# Patient Record
Sex: FEMALE | Race: WHITE
[De-identification: names, ages, dates, MRNs, and addresses within clinical notes are randomized per-mention and may not be internally consistent; named-entity substitution may affect disease eponyms.]

---

## 2018-06-30 ENCOUNTER — HOSPITAL ENCOUNTER (EMERGENCY)
Dept: HOSPITAL 75 - ER | Age: 28
Discharge: HOME | End: 2018-06-30
Payer: COMMERCIAL

## 2018-06-30 VITALS — SYSTOLIC BLOOD PRESSURE: 133 MMHG | DIASTOLIC BLOOD PRESSURE: 78 MMHG

## 2018-06-30 VITALS — BODY MASS INDEX: 35.03 KG/M2 | HEIGHT: 66 IN | WEIGHT: 218 LBS

## 2018-06-30 DIAGNOSIS — N76.0: Primary | ICD-10-CM

## 2018-06-30 DIAGNOSIS — Z32.02: ICD-10-CM

## 2018-06-30 DIAGNOSIS — N72: ICD-10-CM

## 2018-06-30 LAB
ALBUMIN SERPL-MCNC: 4.4 GM/DL (ref 3.2–4.5)
ALP SERPL-CCNC: 87 U/L (ref 40–136)
ALT SERPL-CCNC: 13 U/L (ref 0–55)
BASOPHILS # BLD AUTO: 0 10^3/UL (ref 0–0.1)
BASOPHILS NFR BLD AUTO: 1 % (ref 0–10)
BILIRUB SERPL-MCNC: 0.4 MG/DL (ref 0.1–1)
BUN/CREAT SERPL: 14
CALCIUM SERPL-MCNC: 9.4 MG/DL (ref 8.5–10.1)
CHLORIDE SERPL-SCNC: 108 MMOL/L (ref 98–107)
CO2 SERPL-SCNC: 20 MMOL/L (ref 21–32)
CREAT SERPL-MCNC: 0.71 MG/DL (ref 0.6–1.3)
EOSINOPHIL # BLD AUTO: 0.1 10^3/UL (ref 0–0.3)
EOSINOPHIL NFR BLD AUTO: 1 % (ref 0–10)
ERYTHROCYTE [DISTWIDTH] IN BLOOD BY AUTOMATED COUNT: 12.6 % (ref 10–14.5)
GFR SERPLBLD BASED ON 1.73 SQ M-ARVRAT: > 60 ML/MIN
GLUCOSE SERPL-MCNC: 95 MG/DL (ref 70–105)
HCT VFR BLD CALC: 42 % (ref 35–52)
HGB BLD-MCNC: 14.9 G/DL (ref 11.5–16)
LYMPHOCYTES # BLD AUTO: 1.7 X 10^3 (ref 1–4)
LYMPHOCYTES NFR BLD AUTO: 21 % (ref 12–44)
MANUAL DIFFERENTIAL PERFORMED BLD QL: NO
MCH RBC QN AUTO: 31 PG (ref 25–34)
MCHC RBC AUTO-ENTMCNC: 36 G/DL (ref 32–36)
MCV RBC AUTO: 87 FL (ref 80–99)
MONOCYTES # BLD AUTO: 0.8 X 10^3 (ref 0–1)
MONOCYTES NFR BLD AUTO: 10 % (ref 0–12)
NEUTROPHILS # BLD AUTO: 5.5 X 10^3 (ref 1.8–7.8)
NEUTROPHILS NFR BLD AUTO: 67 % (ref 42–75)
PLATELET # BLD: 216 10^3/UL (ref 130–400)
PMV BLD AUTO: 11.3 FL (ref 7.4–10.4)
POTASSIUM SERPL-SCNC: 3.6 MMOL/L (ref 3.6–5)
PROT SERPL-MCNC: 7.3 GM/DL (ref 6.4–8.2)
RBC # BLD AUTO: 4.81 10^6/UL (ref 4.35–5.85)
SODIUM SERPL-SCNC: 139 MMOL/L (ref 135–145)
WBC # BLD AUTO: 8.1 10^3/UL (ref 4.3–11)

## 2018-06-30 PROCEDURE — 87070 CULTURE OTHR SPECIMN AEROBIC: CPT

## 2018-06-30 PROCEDURE — 96365 THER/PROPH/DIAG IV INF INIT: CPT

## 2018-06-30 PROCEDURE — 76830 TRANSVAGINAL US NON-OB: CPT

## 2018-06-30 PROCEDURE — 84703 CHORIONIC GONADOTROPIN ASSAY: CPT

## 2018-06-30 PROCEDURE — 36415 COLL VENOUS BLD VENIPUNCTURE: CPT

## 2018-06-30 PROCEDURE — 85025 COMPLETE CBC W/AUTO DIFF WBC: CPT

## 2018-06-30 PROCEDURE — 74177 CT ABD & PELVIS W/CONTRAST: CPT

## 2018-06-30 PROCEDURE — 87210 SMEAR WET MOUNT SALINE/INK: CPT

## 2018-06-30 PROCEDURE — 76856 US EXAM PELVIC COMPLETE: CPT

## 2018-06-30 PROCEDURE — 87491 CHLMYD TRACH DNA AMP PROBE: CPT

## 2018-06-30 PROCEDURE — 80053 COMPREHEN METABOLIC PANEL: CPT

## 2018-06-30 PROCEDURE — 87591 N.GONORRHOEAE DNA AMP PROB: CPT

## 2018-06-30 NOTE — XMS REPORT
[facility]

 Created on: 2018



Gayatri Taylor

External Reference #: [id number]

: 1990

Sex: Female



Demographics







 Address  [address] [place]  [address]

 

 Preferred Language  Unknown

 

 Marital Status  Unknown

 

 Congregation Affiliation  Unknown

 

 Race  Unknown

 

 Ethnic Group  Unknown





Author







 Author  ÁLVARODIMPLE

 

 Organization  [facility]

 

 Address  [address] [place]  [address]



 

 Phone  [phone]







Care Team Providers







 Care Team Member Name  Role  Phone

 

 DIMPLE REA  Unavailable  [phone]







PROBLEMS







 Type  Condition  ICD9-CM Code  ZZB01-MT Code  Onset Dates  Condition Status  
SNOMED Code

 

 Problem  Non-seasonal allergic rhinitis due to pollen     J30.1     Active  
[id number]

 

 Problem  Acquired hypothyroidism     E03.9     Active  973098178

 

 Problem  Mixed hyperlipidemia     E78.2     Active  [id number]

 

 Problem  Obesity (BMI 30-39.9)     E66.9     Active  367303977

 

 Problem  Mild single current episode of major depressive disorder     F32.0   
  Active  55122449







ALLERGIES

No Known Allergies



ENCOUNTERS







 Encounter  Location  Date  Diagnosis

 

 [place] [address] [phone]  05 Mar, 2018  Non-seasonal allergic rhinitis due to pollen J30.1

 

 [place] [address] [place] [id number] [place] [phone]
[phone]  26 Sep, 2017  Encounter to establish care with new doctor Z76.Brenden ; 
Acquired hypothyroidism E03.9 ; Mild single current episode of major depressive 
disorder F32.0 and Obesity (BMI 30-39.9) E66.9

 

 [place] [address] [place] [id number] [place] [address]-
[phone]  21 Sep, 2017  Encounter to establish care with new doctor Z76.89 ; 
Acquired hypothyroidism E03.9 ; Mild single current episode of major depressive 
disorder F32.0 ; Obesity (BMI 30-39.9) E66.9 and Encounter for immunization Z23

 

 [place] [address] [place] [id number] [place] [phone]-
[phone]  19 Sep, 2017   

 

 [place] [address] [place] [id number] [place] [phone]-
[phone]  21 Aug, 2017  Acute bronchitis, unspecified organism J20.9 ; Sore throat 
J02.9 and Elevated blood pressure reading R03.0

 

 [facility]  [address] [phone]    Irritable bowel syndrome with diarrhea K58.0 and Anhedonia 
R45.84

 

 [facility]  [address] [phone]-
[phone]    Major depressive disorder, single episode, moderate F32.1

 

 [facility]  [address] [phone]
[phone]  13 2016  Acute vomiting R11.10 and Acute diarrhea R19.7

 

 [facility]  [address] [phone]-
[phone]    Muscle spasm of back M62.830

 

 [facility]  [address] [phone]-
[phone]     

 

 [facility]  [address] [phone]-
[phone]     

 

 [facility]  [address] [phone]-
6260     

 

 [facility]  [address] [place] [id number] [place] [phone]-
[phone]     

 

 [facility]  [address] [place] [id number] [place] [phone]-
[phone]  02 Oct, 2014   

 

 [facility]  [address] [place] [id number] [place] [phone]
[phone]  02 Oct, 2014   

 

 [facility]  [address] [phone]-
[phone]  01 Oct, 2014   

 

 [facility]  [address] [phone]
[phone]  01 Oct, 2014   

 

 [facility]  [address] [place] [id number] [place] [phone]
[phone]  20 Mar, 2014   

 

 [facility]  [address] [facility] [id number] [place] [address]-
2943  20 Mar, 2014   

 

 [facility]  [address] [facility] [id number] [address]-
4012  11 Mar, 2014   

 

 [facility]  [address] [facility] [id number], [place] [address]-
4722  11 Mar, 2014   

 

 [facility]  [address] [facility] [id number], [place] [address]-
4380  15 Stas, 2013   

 

 [facility]  [address] [facility] [id number], [place] [phone]-
[phone]     







IMMUNIZATIONS

No Known Immunizations



SOCIAL HISTORY

Never Assessed



REASON FOR VISIT

Sore throat, cough, congestion, SOB since Friday, sent home from work today---
DBennettRN, nausea, will cough so hard she vomits, Max temp 101 on Friday



PLAN OF CARE







 Activity  Details









  









 Follow Up  4 Weeks Reason:needs to est. care







VITAL SIGNS







 Height  66 in  2017

 

 Weight  215 lbs  2017

 

 Temperature  98.8 degrees Fahrenheit  2017

 

 Heart Rate  120 bpm  2017

 

 Respiratory Rate  20   2017

 

 BMI  34.70 kg/m2  2017

 

 Blood pressure systolic  168 mmHg  2017

 

 Blood pressure diastolic  96 mmHg  2017







MEDICATIONS







 Medication  Instructions  Dosage  Frequency  Start Date  End Date  Duration  
Status

 

 ProAir  (90 Base) MCG/ACT  Inhalation every 4 hrs  2 puffs as needed  
4h  21 Aug, 2017     10 days  Active

 

 PredniSONE 20 mg  Orally Once a day  1 tablet  24h  21 Aug, 2017  26 Aug, 2017
  05 days  Active

 

 Azithromycin 250 MG  Orally Once a day  2 tablets  on the first day, then 1 
tablet daily for 4 days  24h  21 Aug, 2017  26 Aug, 2017  5 day(s)  Active

 

 Acidophilus                    Active







RESULTS

No Results



PROCEDURES







 Procedure  Date Ordered  Result  Body Site

 

 STREP A ASSAY W/OPTIC  Aug 21, 2017      







INSTRUCTIONS





MEDICATIONS ADMINISTERED

No Known Medications



MEDICAL (GENERAL) HISTORY







 Type  Description  Date

 

 Medical History  hypothyroidism; has not been on meds since age 15   

 

 Medical History  hypertension   

 

 Medical History  hyperglycemia   

 

 Surgical History  cracked tail bone  

 

 Surgical History  wisdom teeth extraction

## 2018-06-30 NOTE — ED ABDOMINAL PAIN
General


Chief Complaint:  Abdominal/GI Problems


Stated Complaint:  SHARP PAIN RIGHT SIDE ABDOMEN


Nursing Triage Note:  


ARRIVED VIA AMB TO ROOM 06 WITH COMPLAINTS OF RIGHT LOWER ABD PAIN STARTING 


YESTERDAY.  HAS ALSO HAD CRAMPS SINCE THE .


Sepsis Screen:  No Definite Risk


Source of Information:  Patient


Exam Limitations:  No Limitations





History of Present Illness


Date Seen by Provider:  2018


Time Seen by Provider:  14:44


Initial Comments


to ER with sharp right lower quadrant abdominal pain since the  of this 

month. She initially thought this with the onset of an early menstrual period. 

She should have started her menstrual cycle  but has not yet started it. 

She did follow with [facility] and had a negative pregnancy test done. She

's had some brownish colored vaginal discharge since the  of this month. 

She is sexually active.  She has nausea but no vomiting. No bowel changes. No 

fevers or chills.


Timing/Duration:  1-2 Days


Severity/Quality:  Moderate


Location:  RLQ


Radiation:  No Radiation


Activities at Onset:  None





Allergies and Home Medications


Allergies


Coded Allergies:  


     No Known Drug Allergies (Unverified , 11)





Home Medications


Metronidazole 500 Mg Tablet, 500 MG PO BID


   Prescribed by: ROSALIO SIGALA on 18 7928





Patient Home Medication List


Home Medication List Reviewed:  Yes





Review of Systems


Constitutional:  see HPI


EENTM:  No Symptoms Reported


Respiratory:  No Symptoms Reported


Cardiovascular:  No Symptoms Reported


Gastrointestinal:  See HPI, Abdominal Pain, Nausea; Denies Vomiting


Genitourinary:  No Symptoms Reported


Musculoskeletal:  no symptoms reported


Skin:  no symptoms reported


Psychiatric/Neurological:  No Symptoms Reported


Endocrine:  No Symptoms Reported





Past Medical-Social-Family Hx


Patient Social History


Alcohol Use:  Occasionally Uses


Recreational Drug Use:  No


Smoking Status:  Never a Smoker


Recent Foreign Travel:  No


Contact w/Someone Who Travel:  No


Recent Infectious Disease Expo:  No





Past Medical History


Surgeries:  Yes (TAILBONE SURGERY A LONG TIME AGO)


Respiratory:  No


Cardiac:  No


Neurological:  No


Reproductive Disorders:  No


Genitourinary:  No


Gastrointestinal:  No


Musculoskeletal:  No


Endocrine:  No


HEENT:  No


Cancer:  No


Psychosocial:  No


Integumentary:  No


Blood Disorders:  No





Physical Exam


Vital Signs





Vital Signs - First Documented








 18





 14:06


 


Temp 98.0





Capillary Refill : Less Than 3 Seconds


General Appearance:  WD/WN, no apparent distress


HEENT:  PERRL/EOMI, normal ENT inspection


Neck:  non-tender, full range of motion


Respiratory:  no respiratory distress, no accessory muscle use


Cardiovascular:  regular rate, rhythm, no murmur


Gastrointestinal:  normal bowel sounds, soft, tenderness


Pelvic:  normal external exam, discharge (small amount of whitish discharge. No 

brownish discharge noted.); No lesions, No mass; tender w/ cervical motion, 

other


Neurologic/Psychiatric:  alert, normal mood/affect, oriented x 3


Skin:  normal color, warm/dry





Progress/Results/Core Measures


Results/Orders


Lab Results





Laboratory Tests








Test


 18


14:30 Range/Units


 


 


White Blood Count


 8.1 


 4.3-11.0


10^3/uL


 


Red Blood Count


 4.81 


 4.35-5.85


10^6/uL


 


Hemoglobin 14.9  11.5-16.0  G/DL


 


Hematocrit 42  35-52  %


 


Mean Corpuscular Volume 87  80-99  FL


 


Mean Corpuscular Hemoglobin 31  25-34  PG


 


Mean Corpuscular Hemoglobin


Concent 36 


 32-36  G/DL





 


Red Cell Distribution Width 12.6  10.0-14.5  %


 


Platelet Count


 216 


 130-400


10^3/uL


 


Mean Platelet Volume 11.3 H 7.4-10.4  FL


 


Neutrophils (%) (Auto) 67  42-75  %


 


Lymphocytes (%) (Auto) 21  12-44  %


 


Monocytes (%) (Auto) 10  0-12  %


 


Eosinophils (%) (Auto) 1  0-10  %


 


Basophils (%) (Auto) 1  0-10  %


 


Neutrophils # (Auto) 5.5  1.8-7.8  X 10^3


 


Lymphocytes # (Auto) 1.7  1.0-4.0  X 10^3


 


Monocytes # (Auto) 0.8  0.0-1.0  X 10^3


 


Eosinophils # (Auto)


 0.1 


 0.0-0.3


10^3/uL


 


Basophils # (Auto)


 0.0 


 0.0-0.1


10^3/uL


 


Sodium Level 139  135-145  MMOL/L


 


Potassium Level 3.6  3.6-5.0  MMOL/L


 


Chloride Level 108 H   MMOL/L


 


Carbon Dioxide Level 20 L 21-32  MMOL/L


 


Anion Gap 11  5-14  MMOL/L


 


Blood Urea Nitrogen 10  7-18  MG/DL


 


Creatinine


 0.71 


 0.60-1.30


MG/DL


 


Estimat Glomerular Filtration


Rate > 60 


  





 


BUN/Creatinine Ratio 14   


 


Glucose Level 95    MG/DL


 


Calcium Level 9.4  8.5-10.1  MG/DL


 


Total Bilirubin 0.4  0.1-1.0  MG/DL


 


Aspartate Amino Transf


(AST/SGOT) 13 


 5-34  U/L





 


Alanine Aminotransferase


(ALT/SGPT) 13 


 0-55  U/L





 


Alkaline Phosphatase 87    U/L


 


Total Protein 7.3  6.4-8.2  GM/DL


 


Albumin 4.4  3.2-4.5  GM/DL


 


Serum Pregnancy Test,


Qualitative NEGATIVE 


 NEGATIVE  











Micro Results





Microbiology


18 Genital Culture, Resulted


          Pending


18 KOH Preparation - Final, Resulted


          


18 Wet Prep - Final, Resulted


          





My Orders





Orders - ROSALIO SIGALA


Cbc With Automated Diff (18 14:19)


Comprehensive Metabolic Panel (18 14:19)


Hcg,Qualitative Serum (18 14:19)


Iv Heplock-Insert (Order) (18 14:19)


Wet Prep (18 14:19)


Neisseria Gonorrhea Swab (18 14:19)


Genital Culture (18 14:19)


Koh Prep (18 14:19)


Chlamydia Trachomatis Swab (18 14:19)


Us Non Ob Pelvis Comp/Transvag (18 14:19)


Ct Abd/Pelv W (Appendicitis) (18 15:11)


Iohexol Injection (Omnipaque 350 Mg/Ml 1 (18 15:15)


Ns (Ivpb) (Sodium Chloride 0.9% Ivpb Bag (18 15:15)


Ceftriaxone Injection (Rocephin Injectio (18 16:00)


Azithromycin Tablet (Zithromax Tablet) (18 16:00)





Medications Given in ED





Current Medications








 Medications  Dose


 Ordered  Sig/Ambar


 Route  Start Time


 Stop Time Status Last Admin


Dose Admin


 


 Iohexol  100 ml  ONCE  ONCE


 IV  18 15:15


 18 15:16 UNV 18 15:24


100 ML


 


 Sodium Chloride  100 ml  ONCE  ONCE


 IV  18 15:15


 18 15:16 UNV 18 15:24


100 ML








Vital Signs/I&O











 18





 14:06


 


Temp 98.0


 


B/P (MAP) 











Diagnostic Imaging





   Diagonstic Imaging:  CT


Comments


NAME:   MARCELO SAVAGE


MED REC#:   [id number]


ACCOUNT#:   [id number]


PT STATUS:   REG ER


:   1990


PHYSICIAN:   ROSALIO SIGALA


ADMIT DATE:   18/ER


 ***Draft***


Date of Exam:18





CT ABD/PELV W (APPENDICITIS)








PROCEDURE: CT abdomen and pelvis with contrast, rule out


appendicitis.





TECHNIQUE: Multiple contiguous axial images were obtained through


the abdomen and pelvis after the administration of intravenous


contrast.





INDICATION: Right lower quadrant abdominal pain. 





COMPARISON: None.





FINDINGS: The lung bases are clear. The liver, gallbladder,


spleen, pancreas, adrenal glands, kidneys and vascular structures


are grossly unremarkable. The appendix is normal. Distal ureters


and urinary bladder are normal. The uterus is intact. There is no


free air or free fluid. There is a single prominent gas-filled


loop of small bowel, centrally, within the abdomen which could


represent an early ileus. There is no overt obstruction. There is


no significant constipation. There is no lymphadenopathy or


inflammatory process. Osseous structures are normal.





IMPRESSION: 


1. Prominent nonspecific gas-filled loop of small bowel in the


central abdomen, possibly an early ileus. There is no obstruction


or inflammatory process. 


2. Normal appendix.





  Dictated on workstation # [id number]








Dict:   18 1543


Trans:   18 1548


[facility] [id number]





Interpreted by:     CUBA ALVARES


Electronically signed by:





Departure


Impression





 Primary Impression:  


 Bacterial vaginosis


 Additional Impressions:  


 RLQ abdominal pain


 Cervicitis


Disposition:   HOME, SELF-CARE


Condition:  Stable





Departure-Patient Inst.


Decision time for Depature:  15:53


Referrals:  


[facility] (PCP/Family)


Primary Care Physician


Patient Instructions:  Bacterial Vaginosis





Add. Discharge Instructions:  


1. Return to ER for any concerns


2. Follow-up with your doctor next week


3. Medication as directed


All discharge instructions reviewed with patient and/or family. Voiced 

understanding.


Scripts


Metronidazole (Flagyl) 500 Mg Tablet


500 MG PO BID, #14 TAB


   Prov: ROSALIO SIGALA         18











ROSALIO SIGALA 2018 14:46

## 2018-06-30 NOTE — DIAGNOSTIC IMAGING REPORT
PROCEDURE: CT abdomen and pelvis with contrast, rule out

appendicitis.



TECHNIQUE: Multiple contiguous axial images were obtained through

the abdomen and pelvis after the administration of intravenous

contrast.



INDICATION: Right lower quadrant abdominal pain. 



COMPARISON: None.



FINDINGS: The lung bases are clear. The liver, gallbladder,

spleen, pancreas, adrenal glands, kidneys and vascular structures

are grossly unremarkable. The appendix is normal. Distal ureters

and urinary bladder are normal. The uterus is intact. There is no

free air or free fluid. There is a single prominent gas-filled

loop of small bowel, centrally, within the abdomen which could

represent an early ileus. There is no overt obstruction. There is

no significant constipation. There is no lymphadenopathy or

inflammatory process. Osseous structures are normal.



IMPRESSION: 

1. Prominent nonspecific gas-filled loop of small bowel in the

central abdomen, possibly an early ileus. There is no obstruction

or inflammatory process. 

2. Normal appendix.



Dictated by: 



  Dictated on workstation # [id number]

## 2018-06-30 NOTE — DIAGNOSTIC IMAGING REPORT
INDICATION: Lower pelvic pain. 



COMPARISON: None.



EXAMINATION: Pelvic ultrasound.



FINDINGS: The uterus measures 7 x 5 x 4 cm and has a normal

appearance. The endometrium is normal at 6 mm. The right ovary

measures 2.5 x 4.0 x 2.5 cm. The left ovary is not identified.

There is no mass or free fluid. 



IMPRESSION: Left ovary is not identified by ultrasound;

otherwise, negative pelvic sonogram. 



Dictated by: 



  Dictated on workstation # [id number]

## 2020-01-07 ENCOUNTER — HOSPITAL ENCOUNTER (EMERGENCY)
Dept: HOSPITAL 75 - ER | Age: 30
Discharge: HOME | End: 2020-01-07
Payer: COMMERCIAL

## 2020-01-07 VITALS — SYSTOLIC BLOOD PRESSURE: 133 MMHG | DIASTOLIC BLOOD PRESSURE: 72 MMHG

## 2020-01-07 VITALS — BODY MASS INDEX: 38.62 KG/M2 | WEIGHT: 237.44 LBS | HEIGHT: 65.75 IN

## 2020-01-07 DIAGNOSIS — Z79.52: ICD-10-CM

## 2020-01-07 DIAGNOSIS — J06.9: Primary | ICD-10-CM

## 2020-01-07 PROCEDURE — 87804 INFLUENZA ASSAY W/OPTIC: CPT

## 2020-01-07 NOTE — ED COUGH/URI
General


Chief Complaint:  Cough/Cold/Flu Symptoms


Stated Complaint:  COUGH,CONGESTION


Nursing Triage Note:  


ARRIVED VIA AMB TO TRIAGE WITH COUGH AND CONGESTION X1 WEEK.  STATES SHE WENT TO




[facility] AND THEY DID NOT GIVE HER ANY MEDS A COUPLE OF DAYS AGO AND SHE IS NOT 


BETTER.


Sepsis Screen:  No Definite Risk





History of Present Illness


Date Seen by Provider:  Jan 7, 2020


Time Seen by Provider:  17:45


Initial Comments


29-year-old  female reports cough for the last week. She has been 

taking Mucinex with no improvement in her symptoms. She denies getting a flu 

vaccine pressure. No fevers.


Timing/Duration:  intermittent


Severity/Quality:  dry cough


Prior Episodes/Possible Cause:  no prior episodes


Associated Symptoms:  cough, nasal congestion





Allergies and Home Medications


Allergies


Coded Allergies:  


     No Known Drug Allergies (Unverified , 1/23/11)





Home Medications


Albuterol Sulfate 6.7 Gm Hfa.aer.ad, 2 PUFF INH Q6H


   Prescribed by: NESSA SHIN on 1/7/20 1836


Prednisone 20 Mg Tab, 40 MG PO DAILY


   Prescribed by: NESSA SHIN on 1/7/20 1836





Patient Home Medication List


Home Medication List Reviewed:  Yes





Review of Systems


Review of Systems


Constitutional:  see HPI, malaise


Respiratory:  see HPI, cough; No short of breath





All Other Systems Reviewed


Negative Unless Noted:  Yes





Past Medical-Social-Family Hx


Past Med/Social Hx:  Reviewed Nursing Past Med/Soc Hx


Patient Social History


Recent Foreign Travel:  No


Contact w/Someone Who Travel:  No


Recent Infectious Disease Expo:  No





Past Medical History


Surgeries:  Yes (TAILBONE SURGERY A LONG TIME AGO)


Respiratory:  No


Cardiac:  No


Neurological:  No


Pregnant:  No


Last Menstrual Period:  Jan 7, 2020


Reproductive Disorders:  No


Genitourinary:  No


Gastrointestinal:  No


Musculoskeletal:  No


Endocrine:  No


HEENT:  No


Cancer:  No


Psychosocial:  No


Integumentary:  No


Blood Disorders:  No





Physical Exam





Vital Signs - First Documented








 1/7/20





 17:26


 


Temp 36.9


 


Pulse 118


 


Resp 18


 


B/P (MAP) 133/72 (92)


 


Pulse Ox 98


 


O2 Delivery Room Air





Capillary Refill : Less Than 3 Seconds


Height: 5'6.00"


Weight: 218lbs. oz. 98.126665oq; 38.00 BMI


Method:Stated


General Appearance:  WD/WN, no apparent distress


HEENT:  PERRL/EOMI, normal ENT inspection, TMs normal, pharynx normal


Neck:  non-tender, full range of motion, supple, normal inspection


Respiratory:  chest non-tender, lungs clear, normal breath sounds


Cardiovascular:  normal peripheral pulses, regular rate, rhythm


Gastrointestinal:  normal bowel sounds, non tender, soft


Neurologic/Psychiatric:  no motor/sensory deficits, alert, normal mood/affect, 

oriented x 3


Skin:  normal color, warm/dry





Progress/Results/Core Measures


Suspected Sepsis


Recent Fever Within 48 Hours:  No


Infection Criteria Present:  Suspected New Infection


New/Unexplained  Altered Menta:  No


Sepsis Screen:  No Definite Risk


SIRS


Temperature: 


Pulse: 118 


Respiratory Rate: 18


 


Blood Pressure 133 /72 


Mean: 92





Results/Orders


Micro Results





Microbiology


1/7/20 Influenza Types A,B Antigen (JORDI) - Final, Complete


         





My Orders





Orders - NESSA SHIN


Influenza A And B Antigens (1/7/20 17:45)





Vital Signs/I&O











 1/7/20 1/7/20





 17:26 18:53


 


Temp 36.9 36.9


 


Pulse 118 118


 


Resp 18 18


 


B/P (MAP) 133/72 (92) 133/72 (92)


 


Pulse Ox 98 98


 


O2 Delivery Room Air 





Capillary Refill : Less Than 3 Seconds








Blood Pressure Mean:                    92








Progress Note :  


   Time:  17:45


Progress Note


Patient seen and evaluated, will obtain influenza swab and reevaluate. Pulse is 

80 at this time.





Departure


Impression





   Primary Impression:  


   Upper respiratory infection


   Qualified Codes:  J06.9 - Acute upper respiratory infection, unspecified


Disposition:  01 HOME, SELF-CARE


Condition:  Improved





Departure-Patient Inst.


Decision time for Depature:  18:45


Referrals:  


[facility] [place]


Primary Care Physician


Patient Instructions:  Cough, Adult (DC), Viral Upper Respiratory Infection, 

Adult (DC)





Add. Discharge Instructions:  


Continue taking Mucinex one tablet every 12 hours with a full glass of water.


Use the inhaler every 4-6 hours, 2 puffs waiting 5-10 minutes between puffs.


Sinus irrigation with a Olive pot or Kash med bottle, every 4 hours, while 

awake. 


Follow-up with your primary care provider if symptoms are not improving or 

worsen.


Alternate between Tylenol 650 mg and Tylenol 600 mg every 4 hours for fever or 

discomfort.


Increase rest.


Return to the emergency department for new, urgent health care needs.





All discharge instructions reviewed with patient and/or family. Voiced 

understanding.


Scripts


Albuterol Sulfate (Proventil Hfa) 6.7 Gm Hfa.aer.ad


2 PUFF INH Q6H for SHORTNESS OF BREATH for 10 Days, #1 EACH 0 Refills


   Prov: NESSA SHIN         1/7/20 


Prednisone (Prednisone) 20 Mg Tab


40 MG PO DAILY, #8 TAB 0 Refills


   Prov: NESSA SHIN         1/7/20


Work/School Note:  Work Release Form   Date Seen in the Emergency Department:  

Jan 7, 2020


   Return to Work:  Jan 8, 2020


   Restrictions:  No Restrictions











NESSA SHIN                   Jan 7, 2020 18:23

## 2021-02-18 ENCOUNTER — HOSPITAL ENCOUNTER (EMERGENCY)
Dept: HOSPITAL 75 - ER | Age: 31
Discharge: HOME | End: 2021-02-18
Payer: SELF-PAY

## 2021-02-18 VITALS — DIASTOLIC BLOOD PRESSURE: 79 MMHG | SYSTOLIC BLOOD PRESSURE: 140 MMHG

## 2021-02-18 VITALS — WEIGHT: 239.86 LBS | HEIGHT: 66.97 IN | BODY MASS INDEX: 37.65 KG/M2

## 2021-02-18 DIAGNOSIS — N39.0: ICD-10-CM

## 2021-02-18 DIAGNOSIS — K80.20: Primary | ICD-10-CM

## 2021-02-18 DIAGNOSIS — E66.9: ICD-10-CM

## 2021-02-18 DIAGNOSIS — Z77.22: ICD-10-CM

## 2021-02-18 LAB
ALBUMIN SERPL-MCNC: 5 GM/DL (ref 3.2–4.5)
ALP SERPL-CCNC: 102 U/L (ref 40–136)
ALT SERPL-CCNC: 36 U/L (ref 0–55)
AMYLASE SERPL-CCNC: 65 U/L (ref 25–125)
APTT PPP: YELLOW S
BACTERIA #/AREA URNS HPF: (no result) /HPF
BASOPHILS # BLD AUTO: 0.1 10^3/UL (ref 0–0.1)
BASOPHILS NFR BLD AUTO: 1 % (ref 0–10)
BILIRUB SERPL-MCNC: 0.2 MG/DL (ref 0.1–1)
BILIRUB UR QL STRIP: NEGATIVE
BUN/CREAT SERPL: 15
CALCIUM SERPL-MCNC: 10 MG/DL (ref 8.5–10.1)
CHLORIDE SERPL-SCNC: 105 MMOL/L (ref 98–107)
CO2 SERPL-SCNC: 19 MMOL/L (ref 21–32)
CREAT SERPL-MCNC: 0.81 MG/DL (ref 0.6–1.3)
EOSINOPHIL # BLD AUTO: 0 10^3/UL (ref 0–0.3)
EOSINOPHIL NFR BLD AUTO: 0 % (ref 0–10)
FIBRINOGEN PPP-MCNC: CLEAR MG/DL
GFR SERPLBLD BASED ON 1.73 SQ M-ARVRAT: > 60 ML/MIN
GLUCOSE SERPL-MCNC: 83 MG/DL (ref 70–105)
GLUCOSE UR STRIP-MCNC: NEGATIVE MG/DL
HCT VFR BLD CALC: 48 % (ref 35–52)
HGB BLD-MCNC: 15.8 G/DL (ref 11.5–16)
KETONES UR QL STRIP: NEGATIVE
LEUKOCYTE ESTERASE UR QL STRIP: NEGATIVE
LIPASE SERPL-CCNC: 29 U/L (ref 8–78)
LYMPHOCYTES # BLD AUTO: 2 10^3/UL (ref 1–4)
LYMPHOCYTES NFR BLD AUTO: 20 % (ref 12–44)
MANUAL DIFFERENTIAL PERFORMED BLD QL: NO
MCH RBC QN AUTO: 30 PG (ref 25–34)
MCHC RBC AUTO-ENTMCNC: 33 G/DL (ref 32–36)
MCV RBC AUTO: 91 FL (ref 80–99)
MONOCYTES # BLD AUTO: 0.7 10^3/UL (ref 0–1)
MONOCYTES NFR BLD AUTO: 7 % (ref 0–12)
NEUTROPHILS # BLD AUTO: 7.3 10^3/UL (ref 1.8–7.8)
NEUTROPHILS NFR BLD AUTO: 72 % (ref 42–75)
NITRITE UR QL STRIP: NEGATIVE
PH UR STRIP: 6.5 [PH] (ref 5–9)
PLATELET # BLD: 234 10^3/UL (ref 130–400)
PMV BLD AUTO: 11.4 FL (ref 9–12.2)
POTASSIUM SERPL-SCNC: 3.9 MMOL/L (ref 3.6–5)
PROT SERPL-MCNC: 8.7 GM/DL (ref 6.4–8.2)
PROT UR QL STRIP: NEGATIVE
RBC #/AREA URNS HPF: (no result) /HPF
SODIUM SERPL-SCNC: 140 MMOL/L (ref 135–145)
SP GR UR STRIP: 1.02 (ref 1.02–1.02)
SQUAMOUS #/AREA URNS HPF: (no result) /HPF
WBC # BLD AUTO: 10.1 10^3/UL (ref 4.3–11)
WBC #/AREA URNS HPF: (no result) /HPF

## 2021-02-18 PROCEDURE — 81000 URINALYSIS NONAUTO W/SCOPE: CPT

## 2021-02-18 PROCEDURE — 74176 CT ABD & PELVIS W/O CONTRAST: CPT

## 2021-02-18 PROCEDURE — 83690 ASSAY OF LIPASE: CPT

## 2021-02-18 PROCEDURE — 85025 COMPLETE CBC W/AUTO DIFF WBC: CPT

## 2021-02-18 PROCEDURE — 74022 RADEX COMPL AQT ABD SERIES: CPT

## 2021-02-18 PROCEDURE — 82150 ASSAY OF AMYLASE: CPT

## 2021-02-18 PROCEDURE — 80053 COMPREHEN METABOLIC PANEL: CPT

## 2021-02-18 PROCEDURE — 87088 URINE BACTERIA CULTURE: CPT

## 2021-02-18 PROCEDURE — 36415 COLL VENOUS BLD VENIPUNCTURE: CPT

## 2021-02-18 PROCEDURE — 84703 CHORIONIC GONADOTROPIN ASSAY: CPT

## 2021-02-18 PROCEDURE — 93041 RHYTHM ECG TRACING: CPT

## 2021-02-18 NOTE — DIAGNOSTIC IMAGING REPORT
CLINICAL INDICATION: Patient with abdominal pain and flank pain.



EXAMS: X-ray of the chest PA view and x-ray of the abdomen supine

and upright views.



COMPARISONS: CT scan of the abdomen and pelvis without contrast

dated 02/18/2021. All CT scans use one or more of the following

dose optimizing techniques: automated exposure control, MA and/or

KvP adjustment based on patient size and exam type or iterative

reconstruction.  



FINDINGS:



LUNGS/ PLEURA: Lungs are clear. There is no pneumothorax. There

is no pleural effusion.



MEDIASTINUM: Unremarkable. 



PULMONARY VASCULATURE: Unremarkable.



HEART: Unremarkable.



BONES/ EXTRATHORACIC SOFT TISSUE:  Unremarkable.



ABDOMEN AND PELVIS:

Unremarkable x-ray of the abdomen with nonobstructed bowel gas

pattern. There is no evidence of abdominal free air. There are

multiple shadows of stones overlying the right upper quadrant

which correlates to cholelithiasis. This is better seen on

comparison CT scan.



There are no focal calcifications overlying the expected regions/

pathways of both kidneys, ureters, and bladder regions.



IMPRESSION:

1: There is no radiographic evidence of acute cardiopulmonary

process.



2: Cholelithiasis which is better seen on comparison CT scan.

Otherwise, unremarkable x-ray of the abdomen. 



Dictated by: 



  Dictated on workstation # [id number]

## 2021-02-18 NOTE — DIAGNOSTIC IMAGING REPORT
Clinical indication: Patient with right flank pain and abdominal

pain.



Exam: CT exam of the abdomen and pelvis is performed without IV

or oral contrast using stone protocol. Coronal and sagittal

reformatted images were created. Auto Exposure Controls were

utilized during the CT exam to meet ALARA standards for radiation

dose reduction.



Comparison: CT scan of the abdomen and pelvis performed with

contrast dated 06/30/2018.



Findings: Visualized lung bases are clear. There are small spurs

involving the lumbar spine. There is interval development of

multiple stones within the predominantly decompressed

gallbladder. There is no CT evidence of acute cholecystitis. The

liver, spleen, pancreas and adrenal glands are unremarkable. Both

kidneys are unremarkable with no stone or mass. There is no

hydronephrosis. Bladder is partially fluid-filled and otherwise

unremarkable. Uterus and adnexal structures are unremarkable.

There is no significant intra-abdominal or intra-pelvic

lymphadenopathy. There is no acute abdominal free air or free

fluid. Stable nodular area seen anterior to the spleen which may

represent a splenule. There is small amount of stool in the right

colon and rectum region. There is no intestinal obstruction.

Stable subcentimeter in short axis mesenteric lymph node seen.

Appendix is unremarkable. The extra-abdominal and extra-pelvis

soft tissue structures are unremarkable.



Impression: 

1: There is no CT evidence of acute abdominal pelvic process.

There are no renal stones seen or hydronephrosis.



2: Interval development of cholelithiasis with multiple stones

within the decompressed gallbladder. There is no CT evidence of

acute cholecystitis.



3: The remainder of this exam shows no significant abnormality. 



Dictated by: 



  Dictated on workstation # [id number]

## 2021-02-18 NOTE — ED ABDOMINAL PAIN
General


Chief Complaint:  Abdominal/GI Problems


Stated Complaint:  LOWER BACK / ABDOMINAL PAIN


Nursing Triage Note:  


PATIENT STATES THAT SHE HAS HAD LEFT SIDE PAIN FOR THE PAST TWO MONTHS AND 


WORSENING TODAY. CURRENTLY RATING 7/10.  STATES THAT HER URINE IS DARK. SHE WENT




TO [facility] TODAY FOR THIS COMPLAINT.  SHE STATES THAT SHE IS FRUSTRATED THAT THEY 


WERE ABLE TO GIVE HER A DEFINITE DIAGNOSIS.  SHE STATES THAT SHE WAS TOLD THAT 


IT COULD BE POLYCYSTIC OVARIAN SYNDROME.


Sepsis Screen:  No Definite Risk


Source of Information:  Patient





History of Present Illness


Date Seen by Provider:  Feb 18, 2021


Time Seen by Provider:  20:39


Initial Comments


PT ARRIVES VIA POV FROM HOME


C/O LEFT FLANK PAIN FOR 2 MONTHS, WORSE TODAY. 


SEEN AT [facility] TODAY, NO TESTS DONE AND NO DX, NO RX


NO RADIATION OF PAIN 


STATES HER URINE IS DARK AND OCCASIONALLY IT "STINGS" WHEN SHE URINATES


HAD FEVER  A COUPLE OF WEEKS AGO\


AT THAT TIME SHE DID HAVE NAUSEA AND VOMITED X 1 WHEN SHE HAD FEVER, NONE SINCE


HAS HAD DIARRHEA OFF AND ON--LAST TIME WAS 2 DAYS AGO





HAS NOT TAKEN ANYTHING FOR PAIN 





LMP--2 MONTHS AGO. STATES SHE HAS HAD OVARIAN CYSTS AND POSSIBLE PCOS. TAKES 

SPIRONOLACTONE FOR ELEVATED TESTOSTERONE LEVELS. 








NO COUGH


NO CHEST PAIN 


NO SHORTNESS OF BREATH


NO SORE THROAT OR LOSS OF TASTE OR SMELL


HAS BEEN EXPOSED TO COVID AT WORK ( WORKS AT [employer]) --HAD A 

NEGATIVE TEST 1 MONTH AGO.





[facility]





Allergies and Home Medications


Allergies


Coded Allergies:  


     No Known Drug Allergies (Unverified , 1/23/11)





Home Medications


Albuterol Sulfate 6.7 Gm Hfa.aer.ad, 2 PUFF INH Q6H


   Prescribed by: NESSA SHIN on 1/7/20 1836


Cyclobenzaprine HCl 10 Mg Tablet, 10 MG PO Q8H PRN for SPASMS


   Prescribed by: BETITO CHANEL on 2/18/21 2227


Fluticasone Propionate 9.9 Ml [place].susp, 1 SPRAY NS DAILY, (Reported)


   1 SPRAY EACH NARE DAILY 


Meloxicam 15 Mg Tablet, 15 MG PO DAILY


   Prescribed by: BETITO CHANEL on 2/18/21 2227


Nitrofurantoin Monohyd/M-Cryst 100 Mg Capsule, 1 TAB PO BID


   Prescribed by: BETITO CHANEL on 2/18/21 2227


Ondansetron 4 Mg Tab.rapdis, 4 MG PO Q4H


   Prescribed by: BETITO CHANEL on 2/18/21 2227





Patient Home Medication List


Home Medication List Reviewed:  Yes





Review of Systems


Review of Systems


Constitutional:  see HPI (NO FEVER FOR 2 WEEKS)


EENTM:  No Symptoms Reported


Respiratory:  No Symptoms Reported


Cardiovascular:  No Symptoms Reported


Gastrointestinal:  See HPI


Genitourinary:  See HPI, Burning, Flank Pain


Musculoskeletal:  see HPI, back pain


Skin:  no symptoms reported


Psychiatric/Neurological:  No Symptoms Reported


Endocrine:  See HPI


Hematologic/Lymphatic:  No Symptoms Reported





Past Medical-Social-Family Hx


Past Med/Social Hx:  Reviewed and Corrections made


Patient Social History


Alcohol Use:  Occasionally Uses


Drug of Choice:  DENIES


Smoking Status:  Never a Smoker


2nd Hand Smoke Exposure:  Yes


Recent Infectious Disease Expo:  No





Past Medical History


Surgeries:  Yes (TAILBONE SURGERY A LONG TIME AGO/?PILONIDAL CYST: OVARIAN CYST 

REMOVAL)


Respiratory:  No


Cardiac:  No


Neurological:  No


Reproductive Disorders:  Yes (HIGH TESTOSTERONE)


Female Reproductive Disorders:  Menstrual Problems, Ovarian Cyst


Genitourinary:  No


Gastrointestinal:  Yes (GALLSTONES NOTED ON CT SCAN 02/18/21)


Gall Bladder Disease


Musculoskeletal:  No


Endocrine:  No


HEENT:  No


Cancer:  No


Psychosocial:  No


Integumentary:  No


Blood Disorders:  No





Physical Exam


Vital Signs





Vital Signs - First Documented








 2/18/21





 20:30


 


Temp 36.5


 


Pulse 104


 


Resp 22


 


B/P (MAP) 168/98 (121)


 


Pulse Ox 98


 


O2 Delivery Room Air





Capillary Refill : Less Than 3 Seconds


Height/Weight/BMI


Height: 5'6.00"


Weight: 218lbs. oz. 98.881635re; 37.00 BMI


Method:Stated


General Appearance:  WD/WN, no apparent distress, obese


Neck:  normal inspection


Respiratory:  normal breath sounds, no respiratory distress, no accessory muscle

use


Cardiovascular:  regular rate, rhythm, no murmur


Gastrointestinal:  normal bowel sounds, soft, no organomegaly, no pulsatile 

mass; No distended, No guarding, No rebound; tenderness (MILD TENDERNESS TO LEFT

FLANK AND TO SUPRAPUBIC AREA. ); No hernia, No mass


Extremities:  normal inspection, no pedal edema, normal capillary refill


Back:  no vertebral tenderness, CVA tenderness (L)


Neurologic/Psychiatric:  CNs II-XII nml as tested, no motor/sensory deficits, 

alert, normal mood/affect, oriented x 3


Skin:  normal color, warm/dry; No rash





Progress/Results/Core Measures


Results/Orders


Lab Results





Laboratory Tests








Test


 2/18/21


21:00 2/18/21


21:54 Range/Units


 


 


Urine Color YELLOW    


 


Urine Clarity CLEAR    


 


Urine pH 6.5   5-9  


 


Urine Specific Gravity 1.020   1.016-1.022  


 


Urine Protein NEGATIVE   NEGATIVE  


 


Urine Glucose (UA) NEGATIVE   NEGATIVE  


 


Urine Ketones NEGATIVE   NEGATIVE  


 


Urine Nitrite NEGATIVE   NEGATIVE  


 


Urine Bilirubin NEGATIVE   NEGATIVE  


 


Urine Urobilinogen 1.0   < = 1.0  MG/DL


 


Urine Leukocyte Esterase NEGATIVE   NEGATIVE  


 


Urine RBC (Auto) NEGATIVE   NEGATIVE  


 


Urine RBC NONE    /HPF


 


Urine WBC RARE    /HPF


 


Urine Squamous Epithelial


Cells 5-10 


 


  /HPF





 


Urine Crystals NONE    /LPF


 


Urine Bacteria MODERATE H   /HPF


 


Urine Casts NONE    /LPF


 


Urine Mucus SMALL H   /LPF


 


Urine Culture Indicated YES    


 


White Blood Count


 


 10.1 


 4.3-11.0


10^3/uL


 


Red Blood Count


 


 5.25 H


 3.80-5.11


10^6/uL


 


Hemoglobin  15.8  11.5-16.0  g/dL


 


Hematocrit  48  35-52  %


 


Mean Corpuscular Volume  91  80-99  fL


 


Mean Corpuscular Hemoglobin  30  25-34  pg


 


Mean Corpuscular Hemoglobin


Concent 


 33 


 32-36  g/dL





 


Red Cell Distribution Width  12.1  10.0-14.5  %


 


Platelet Count


 


 234 


 130-400


10^3/uL


 


Mean Platelet Volume  11.4  9.0-12.2  fL


 


Immature Granulocyte % (Auto)  0   %


 


Neutrophils (%) (Auto)  72  42-75  %


 


Lymphocytes (%) (Auto)  20  12-44  %


 


Monocytes (%) (Auto)  7  0-12  %


 


Eosinophils (%) (Auto)  0  0-10  %


 


Basophils (%) (Auto)  1  0-10  %


 


Neutrophils # (Auto)


 


 7.3 


 1.8-7.8


10^3/uL


 


Lymphocytes # (Auto)


 


 2.0 


 1.0-4.0


10^3/uL


 


Monocytes # (Auto)


 


 0.7 


 0.0-1.0


10^3/uL


 


Eosinophils # (Auto)


 


 0.0 


 0.0-0.3


10^3/uL


 


Basophils # (Auto)


 


 0.1 


 0.0-0.1


10^3/uL


 


Immature Granulocyte # (Auto)


 


 0.0 


 0.0-0.1


10^3/uL


 


Sodium Level  140  135-145  MMOL/L


 


Potassium Level  3.9  3.6-5.0  MMOL/L


 


Chloride Level  105    MMOL/L


 


Carbon Dioxide Level  19 L 21-32  MMOL/L


 


Anion Gap  16 H 5-14  MMOL/L


 


Blood Urea Nitrogen  12  7-18  MG/DL


 


Creatinine


 


 0.81 


 0.60-1.30


MG/DL


 


Estimat Glomerular Filtration


Rate 


 > 60 


  





 


BUN/Creatinine Ratio  15   


 


Glucose Level  83    MG/DL


 


Calcium Level  10.0  8.5-10.1  MG/DL


 


Corrected Calcium    8.5-10.1  MG/DL


 


Total Bilirubin  0.2  0.1-1.0  MG/DL


 


Aspartate Amino Transf


(AST/SGOT) 


 21 


 5-34  U/L





 


Alanine Aminotransferase


(ALT/SGPT) 


 36 


 0-55  U/L





 


Alkaline Phosphatase  102    U/L


 


Total Protein  8.7 H 6.4-8.2  GM/DL


 


Albumin  5.0 H 3.2-4.5  GM/DL


 


Amylase Level  65    U/L


 


Lipase  29  8-78  U/L








My Orders





Orders - BETITO CHANEL DO


Ed Iv/Invasive Line Start (2/18/21 20:45)


Urine Pregnancy Bedside (2/18/21 20:45)


Monitor-Rhythm Ecg Trace Only (2/18/21 20:45)


Ct Abd/Pelvis Wo(Kidney Stone) (2/18/21 20:45)


Acute Abd Series (2/18/21 20:45)


Amylase (2/18/21 20:45)


Cbc With Automated Diff (2/18/21 20:45)


Comprehensive Metabolic Panel (2/18/21 20:45)


Lipase (2/18/21 20:45)


Ua Culture If Indicated (2/18/21 20:45)


Ed Iv/Invasive Line Start (2/18/21 20:45)


Lactated Ringers (Lr 1000 Ml Iv Solution (2/18/21 20:45)


Urine Culture (2/18/21 21:00)


Ketorolac Injection (Toradol Injection) (2/18/21 22:30)


Ceftriaxone  For Iv Use (Rocephin  For I (2/18/21 22:30)





Medications Given in ED





Current Medications








 Medications  Dose


 Ordered  Sig/Ambar


 Route  Start Time


 Stop Time Status Last Admin


Dose Admin


 


 Ceftriaxone


 Sodium 1000 mg/


 Sterile Water  10 ml @ 


 200 mls/hr  ONCE  ONCE


 IV  2/18/21 22:30


 2/18/21 22:32 DC 2/18/21 22:27


200 MLS/HR


 


 Ketorolac


 Tromethamine  30 mg  ONCE  ONCE


 IVP  2/18/21 22:30


 2/18/21 22:31 DC 2/18/21 22:27


30 MG


 


 Lactated Ringer's  1,000 ml @ 


 0 mls/hr  Q0M ONCE


 IV  2/18/21 20:45


 2/18/21 20:47 DC 2/18/21 21:08


1,000 MLS/HR








Vital Signs/I&O











 2/18/21





 20:30


 


Temp 36.5


 


Pulse 104


 


Resp 22


 


B/P (MAP) 168/98 (121)


 


Pulse Ox 98


 


O2 Delivery Room Air














 2/19/21





 00:00


 


Intake Total 1010 ml


 


Balance 1010 ml














Blood Pressure Mean:                    121











Progress


Progress Note :  


Progress Note


GIVEN IV FLUIDS, TORADOL AND ROCEPHIN


SYMPTOMS IMPROVED AT DISMISSAL





UNEVENTFUL ER STAY





Diagnostic Imaging





Comments


ABDOMEN XRAYS--PER RADIOLOGIST REPORT AT 2206





ABDOMEN AND PELVIS:


Unremarkable x-ray of the abdomen with nonobstructed bowel gas


pattern. There is no evidence of abdominal free air. There are


multiple shadows of stones overlying the right upper quadrant


which correlates to cholelithiasis. This is better seen on


comparison CT scan.





There are no focal calcifications overlying the expected regions/


pathways of both kidneys, ureters, and bladder regions.





IMPRESSION:


1: There is no radiographic evidence of acute cardiopulmonary


process.





2: Cholelithiasis which is better seen on comparison CT scan.


Otherwise, unremarkable x-ray of the abdomen.





CT ABDOMEN/PELVIS--PER RADIOLOGIST REPORT 2220


Impression: 


1: There is no CT evidence of acute abdominal pelvic process.


There are no renal stones seen or hydronephrosis.





2: Interval development of cholelithiasis with multiple stones


within the decompressed gallbladder. There is no CT evidence of


acute cholecystitis.





3: The remainder of this exam shows no significant abnormality.


   Reviewed:  Reviewed by Me





Departure


Impression





   Primary Impression:  


   Left flank pain


   Additional Impressions:  


   Cholelithiasis


   Urinary tract infection


Disposition:  01 HOME, SELF-CARE


Condition:  Improved





Departure-Patient Inst.


Referrals:  


[facility] (PCP/Family)


Primary Care Physician








JOVANI SHETH MD


Patient Instructions:  Flank Pain (DC), Gallstones (DC), Urinary Tract 

Infection, Adult (DC)





Add. Discharge Instructions:  


LOTS OF CLEAR LIQUIDS--WATER, BROTH, JELLO, GATORADE. NO COFFEE, POP OR TEA





FOODS AS TOLERATED





FOLLOW UP WITH DR. SHETH FOR FURTHER EVALUATION OF GALLSTONES





FOLLOW UP WITH [facility] IN 1 WEEK TO RECHECK URINE








All discharge instructions reviewed with patient and/or family. Voiced 

understanding.


Scripts


Ondansetron (Ondansetron Odt) 4 Mg Tab.rapdis


4 MG PO Q4H for Nausea/Vomiting, #10 TAB


   Prov: BETITO CHANEL DO         2/18/21 


Nitrofurantoin Monohyd/M-Cryst (Macrobid 100 mg Capsule) 100 Mg Capsule


1 TAB PO BID, #20 CAP


   Prov: BETITO CHANEL DO         2/18/21 


Meloxicam (Mobic) 15 Mg Tablet


15 MG PO DAILY, #10 TAB


   Prov: BETITO CHANEL DO         2/18/21 


Cyclobenzaprine HCl (Cyclobenzaprine HCl) 10 Mg Tablet


10 MG PO Q8H PRN for SPASMS, #15 TAB 0 Refills


   Prov: BETITO CHANEL DO         2/18/21


Work/School Note:  Work Release Form   Date Seen in the Emergency Department:  

Feb 18, 2021











BETITO CHANEL DO                 Feb 18, 2021 20:51

## 2021-03-10 ENCOUNTER — HOSPITAL ENCOUNTER (EMERGENCY)
Dept: HOSPITAL 75 - ER | Age: 31
Discharge: HOME | End: 2021-03-10
Payer: COMMERCIAL

## 2021-03-10 ENCOUNTER — HOSPITAL ENCOUNTER (OUTPATIENT)
Dept: HOSPITAL 75 - PREOP | Age: 31
Discharge: HOME | End: 2021-03-10
Attending: SURGERY
Payer: COMMERCIAL

## 2021-03-10 VITALS — WEIGHT: 240.08 LBS | HEIGHT: 65.98 IN | BODY MASS INDEX: 38.58 KG/M2

## 2021-03-10 VITALS — BODY MASS INDEX: 38.66 KG/M2 | WEIGHT: 240.52 LBS | HEIGHT: 66.02 IN

## 2021-03-10 VITALS — SYSTOLIC BLOOD PRESSURE: 176 MMHG | DIASTOLIC BLOOD PRESSURE: 105 MMHG

## 2021-03-10 DIAGNOSIS — Z00.00: Primary | ICD-10-CM

## 2021-03-10 DIAGNOSIS — Z01.818: Primary | ICD-10-CM

## 2021-03-10 DIAGNOSIS — Z77.22: ICD-10-CM

## 2021-03-10 DIAGNOSIS — E66.9: ICD-10-CM

## 2021-03-10 PROCEDURE — 82274 ASSAY TEST FOR BLOOD FECAL: CPT

## 2021-03-10 NOTE — ED GENERAL
General


Chief Complaint:  Rect Problems


Stated Complaint:  RECTAL BLEEDING


Nursing Triage Note:  


PT REPORTS HAVING A X1 BOWEL MOVEMENT TODAY WITH "OLD BLOOD" IN IT.


Nursing Sepsis Screen:  No Definite Risk


Source of Information:  Patient





History of Present Illness


Date Seen by Provider:  Mar 10, 2021


Time Seen by Provider:  22:55


Initial Comments


PT ARRIVES VIA POV


STATES SHE HAD BM THIS AM AROUND 0700 AND "THERE WAS A STRING OF OLD BLOOD IN 

IT" 


STATES SHE ALSO STARTED HER PERIOD AT THE SAME TIME AS THE BOWEL MOVEMENT


STATES TONIGHT AT WORK, SHE WENT TO THE BATHROOM TO URINATE, AND AFTER SHE HAD 

"CLEANED OFF" "A BUNCH OF OLD BLOOD SHOT OUT"--STATES IT CAME  FROM HER RECTUM, 

SO SHE RUSHED HERE. 


NO RECTAL PAIN


DENIES ANY HARD STOOLS OR STRAINING


NO ABDOMINAL PAIN 


NO NAUSEA/VOMITING


NO FEVER


NO URINARY SYMPTOMS


NO HISTORY OF SIMILAR





PCP: [facility]





Allergies and Home Medications


Allergies


Coded Allergies:  


     No Known Drug Allergies (Unverified , 1/23/11)





Home Medications


Fluticasone Propionate 9.9 Ml [place].susp, 1 SPRAY NS DAILY, (Reported)


   1 SPRAY EACH NARE DAILY 


[bcp]  , 1 TAB PO DAILY, (Reported)





Patient Home Medication List


Home Medication List Reviewed:  Yes





Review of Systems


Review of Systems


Constitutional:  no symptoms reported


Respiratory:  no symptoms reported


Cardiovascular:  no symptoms reported


Gastrointestinal:  see HPI


Genitourinary:  no symptoms reported


Pregnant:  No


LMP:  Mar 10, 2021


Musculoskeletal:  no symptoms reported


Skin:  no symptoms reported


Psychiatric/Neurological:  No Symptoms Reported


Hematologic/Lymphatic:  No Symptoms Reported


Immunological/Allergic:  no symptoms reported





Past Medical-Social-Family Hx


Past Med/Social Hx:  Reviewed and Corrections made


Patient Social History


Alcohol Use:  Occasionally Uses


Drug of Choice:  DENIES


Smoking Status:  Never a Smoker


2nd Hand Smoke Exposure:  Yes


Recent Infectious Disease Expo:  No


Recent Hopitalizations:  No





Seasonal Allergies


Seasonal Allergies:  Yes





Past Medical History


Surgeries:  Yes (TAILBONE SURGERY A LONG TIME AGO/?PILONIDAL CYST: OVARIAN CYST 

REMOVAL)


Respiratory:  No


Cardiac:  No


Neurological:  No


Reproductive Disorders:  Yes (HIGH TESTOSTERONE)


Female Reproductive Disorders:  Menstrual Problems, Ovarian Cyst


Genitourinary:  No


Gastrointestinal:  Yes (GALLSTONES NOTED ON CT SCAN 02/18/21)


Gall Bladder Disease


Musculoskeletal:  No


Endocrine:  No


HEENT:  No


Cancer:  No


Psychosocial:  No


Integumentary:  No


Blood Disorders:  No





Physical Exam


Vital Signs





Vital Signs - First Documented








 3/10/21





 22:40


 


Temp 36.2


 


Pulse 106


 


Resp 16


 


B/P (MAP) 176/105 (128)


 


O2 Delivery Room Air





Capillary Refill : Less Than 3 Seconds


Height, Weight, BMI


Height: 5'6.00"


Weight: 218lbs. oz. 98.410589kn; 38.00 BMI


Method:Stated


General Appearance:  No Apparent Distress, WD/WN, Obese


Respiratory:  Normal Breath Sounds, No Accessory Muscle Use, No Respiratory 

Distress


Cardiovascular:  Regular Rate, Rhythm, No Murmur


Gastrointestinal:  Non Tender, Soft


Rectal:  Normal Exam, Normal Rectal Tone, Heme Negative Stool, Hemorrhoids (VERY

 MINOR EXTERNAL HEMORRHOID. NOT INFLAMED, NOT BLEEDING. NOT TENDERN. ); No Mass,

 No Tenderness; Other (NO GROSS BLOOD ON RECTAL EXAM. NO STOOL IN RECTUM. NO 

EVIDENCE OF ANAL FISSURE OR FISTULA. )


Genital/Rectal:  Other (NO BLOOD FROM VAGINA AT THIS TIME. )


Back:  No CVA Tenderness


Neurologic/Psychiatric:  Alert, Oriented x3


Skin:  Normal Color, Warm/Dry





Progress/Results/Core Measures


Suspected Sepsis


Recent Fever Within 48 Hours:  No


Infection Criteria Present:  None


New/Unexplained  Altered Menta:  No


Sepsis Screen:  No Definite Risk


SIRS


Temperature: 


Pulse: 106 


Respiratory Rate: 16


 


Blood Pressure 176 /105 


Mean: 128





Results/Orders


Vital Signs/I&O











 3/10/21





 22:40


 


Temp 36.2


 


Pulse 106


 


Resp 16


 


B/P (MAP) 176/105 (128)


 


O2 Delivery Room Air





Capillary Refill : Less Than 3 Seconds








Blood Pressure Mean:                    128








Progress Note :  


Progress Note


HEMOCCULT NEGATIVE. 


NO EVIDENCE OF RECTAL BLEEDING ON EXAM.





Departure


Impression





   Primary Impression:  


   General medical exam


Disposition:  01 HOME, SELF-CARE


Condition:  Stable





Departure-Patient Inst.


Referrals:  


[facility] (PCP[facility]Family)


Primary Care Physician


Patient Instructions:  NO INSTRUCTIONS GIVEN





Add. Discharge Instructions:  


FOLLOW UP WITH YOUR DR IF SYMPTOMS PERSIST





All discharge instructions reviewed with patient and/or family. Voiced 

understanding.











BETITO CHANEL DO                 Mar 10, 2021 23:13

## 2021-03-17 ENCOUNTER — HOSPITAL ENCOUNTER (OUTPATIENT)
Dept: HOSPITAL 75 - SDC | Age: 31
Discharge: HOME | End: 2021-03-17
Attending: SURGERY
Payer: COMMERCIAL

## 2021-03-17 VITALS — SYSTOLIC BLOOD PRESSURE: 155 MMHG | DIASTOLIC BLOOD PRESSURE: 98 MMHG

## 2021-03-17 VITALS — SYSTOLIC BLOOD PRESSURE: 144 MMHG | DIASTOLIC BLOOD PRESSURE: 82 MMHG

## 2021-03-17 VITALS — SYSTOLIC BLOOD PRESSURE: 135 MMHG | DIASTOLIC BLOOD PRESSURE: 71 MMHG

## 2021-03-17 VITALS — SYSTOLIC BLOOD PRESSURE: 138 MMHG | DIASTOLIC BLOOD PRESSURE: 86 MMHG

## 2021-03-17 VITALS — SYSTOLIC BLOOD PRESSURE: 143 MMHG | DIASTOLIC BLOOD PRESSURE: 79 MMHG

## 2021-03-17 VITALS — SYSTOLIC BLOOD PRESSURE: 145 MMHG | DIASTOLIC BLOOD PRESSURE: 79 MMHG

## 2021-03-17 VITALS — SYSTOLIC BLOOD PRESSURE: 136 MMHG | DIASTOLIC BLOOD PRESSURE: 82 MMHG

## 2021-03-17 VITALS — SYSTOLIC BLOOD PRESSURE: 154 MMHG | DIASTOLIC BLOOD PRESSURE: 87 MMHG

## 2021-03-17 VITALS — WEIGHT: 240.52 LBS | HEIGHT: 66.02 IN | BODY MASS INDEX: 38.66 KG/M2

## 2021-03-17 VITALS — DIASTOLIC BLOOD PRESSURE: 81 MMHG | SYSTOLIC BLOOD PRESSURE: 135 MMHG

## 2021-03-17 VITALS — DIASTOLIC BLOOD PRESSURE: 95 MMHG | SYSTOLIC BLOOD PRESSURE: 120 MMHG

## 2021-03-17 VITALS — SYSTOLIC BLOOD PRESSURE: 156 MMHG | DIASTOLIC BLOOD PRESSURE: 86 MMHG

## 2021-03-17 VITALS — SYSTOLIC BLOOD PRESSURE: 136 MMHG | DIASTOLIC BLOOD PRESSURE: 74 MMHG

## 2021-03-17 DIAGNOSIS — K80.10: ICD-10-CM

## 2021-03-17 DIAGNOSIS — K29.50: Primary | ICD-10-CM

## 2021-03-17 DIAGNOSIS — E66.9: ICD-10-CM

## 2021-03-17 DIAGNOSIS — Z83.3: ICD-10-CM

## 2021-03-17 DIAGNOSIS — K21.00: ICD-10-CM

## 2021-03-17 DIAGNOSIS — Z79.899: ICD-10-CM

## 2021-03-17 DIAGNOSIS — K62.5: ICD-10-CM

## 2021-03-17 PROCEDURE — 84703 CHORIONIC GONADOTROPIN ASSAY: CPT

## 2021-03-17 PROCEDURE — 87081 CULTURE SCREEN ONLY: CPT

## 2021-03-17 PROCEDURE — 88305 TISSUE EXAM BY PATHOLOGIST: CPT

## 2021-03-17 PROCEDURE — 76000 FLUOROSCOPY <1 HR PHYS/QHP: CPT

## 2021-03-17 PROCEDURE — 88304 TISSUE EXAM BY PATHOLOGIST: CPT

## 2021-03-17 RX ADMIN — ONDANSETRON PRN MG: 2 INJECTION, SOLUTION INTRAMUSCULAR; INTRAVENOUS at 10:37

## 2021-03-17 RX ADMIN — SODIUM CHLORIDE, SODIUM LACTATE, POTASSIUM CHLORIDE, AND CALCIUM CHLORIDE PRN MLS/HR: 600; 310; 30; 20 INJECTION, SOLUTION INTRAVENOUS at 07:48

## 2021-03-17 RX ADMIN — ONDANSETRON PRN MG: 2 INJECTION, SOLUTION INTRAMUSCULAR; INTRAVENOUS at 10:54

## 2021-03-17 RX ADMIN — SODIUM CHLORIDE, SODIUM LACTATE, POTASSIUM CHLORIDE, AND CALCIUM CHLORIDE PRN MLS/HR: 600; 310; 30; 20 INJECTION, SOLUTION INTRAVENOUS at 10:36

## 2021-03-17 NOTE — OPERATIVE REPORT
DATE OF SERVICE:  03/17/2021



PREOPERATIVE DIAGNOSES:

Epigastric pain, right upper quadrant pain with some possible gastritis.



POSTOPERATIVE DIAGNOSES:

Gastritis, questionable gastric ulcer versus gastric diverticulum and some

esophagitis.



PROCEDURE:

EGD with biopsy.



SURGEON:

Salty Dickson DO



FIRST ASSISTANT:

None.



ANESTHESIA:

General endotracheal tube.



SPECIMEN:

Biopsy from the antrum what it was either a diverticulum or an ulcer as well as

then biopsy of the body of stomach and biopsy of GE junction.



BLOOD LOSS:

Scant.



FLUIDS:

Per anesthesia.



POSTOPERATIVE CONDITION:

Stable.



INDICATION FOR PROCEDURE:

The patient is a 30-year-old female who has been having some epigastric and

right upper quadrant pain and some sounds like gastritis symptoms, needed a

workup.



FINDINGS:

The patient had some gastritis what looked like either a gastric ulcer or

gastric diverticulum and some esophagitis.



PROCEDURE NOTE:

After informed consent was obtained, the patient was in the operating room.  She

had just gotten her gallbladder removed and now performed the EGD.  Placed the

scope down the mouth through the esophagus into the stomach, saw some mild

gastritis in the stomach, pushed into the duodenum.  Duodenum looked fine. 

Pulled back and just before the pylorus, there was what looked like either a

diverticulum or an ulcer, did not appear to be any healing in the bed of this,

_____ looked more like a diverticulum.  We did a biopsy here.  Retroflexed the

scope, did a biopsy of body of stomach and then pulled the scope into the GE

junction, saw some esophagitis, took a picture of this and then did a biopsy. 

At this point, then pushed the scope into the stomach, suctioned all the air out

and then pulled the scope up the esophagus and out the mouth.  The patient

tolerated the procedure.





Job ID: [id number]

DocumentID: [id number]

Dictated Date:  03/17/2021 14:40:10

Transcription Date: 03/17/2021 20:32:07

Dictated By: SALTY DICKSON DO

## 2021-03-17 NOTE — ENDOSCOPY DISCHARGE INSTRUCT
Endo Procedure/Findings


Findings


1.:  Gastritis


2.:  Other Findings (??ulcer vs diverticulum)





Discharge Instructions


-


Activity: You might feel a little sleepy until tomorrow.  This is due to the 

medicine you received to relax you.





Until tomorrow, you should:  


   NOT drive a car, operate machinery or power tools.


   NOT drink any alcoholic beverages.


   NOT make any important decisions or sign importortant papers.





Do not return to work until tomorrow, unless otherwise instructed. Resume 

previous activities tomorrow.





Diet: Start by taking liquids.  If you tolerate liquids, advance to solid food.


1.:  EGD in 6-8 weeks





Notify Physician


-


If you experience excessive bleeding, unusual abdominal pain, fever, or chest 

pain, contact your doctor immediately.











MILTON NDIAYE DO               Mar 17, 2021 09:55

## 2021-03-17 NOTE — PROGRESS NOTE-POST OPERATIVE
Post-Operative Progess Note


Surgeon (s)/Assistant (s)


Surgeon


MILTON NDIAYE DO


Assistant:  none





Pre-Operative Diagnosis


Gastritis





Post-Operative Diagnosis





Gastric ulcer vs diverticulum


gastritis


esophagitis





Procedure & Operative Findings


Date of Procedure


3/17/21


Procedure Performed/Findings


EGD with bx


Anesthesia Type


GET





Estimated Blood Loss


Estimated blood loss (mL):  scant





Specimens/Packing


Specimens Removed


antral bx


body of stomach bx


GE jxn











MILTON NDIAYE DO               Mar 17, 2021 09:54

## 2021-03-17 NOTE — DIAGNOSTIC IMAGING REPORT
INDICATION:  Cholelithiasis, flank pain, undergoing

cholecystectomy



FINDINGS: Single intraoperative cholangiogram image along with a

cineloop are submitted. There is cannulation of the extra hepatic

biliary tree. Images demonstrate contrast opacification of the

biliary system. Biliary tree is not significantly dilated. There

was no persistent filling defect to indicate a retained stone.

Flow was present into the duodenum.



IMPRESSION: Negative laparoscopic cholangiogram.







Fluoroscopy Time: 10.2 seconds

Number of Images Acquired:  1 static, 1 cineloop



Dictated by: 



  Dictated on workstation # [id number]

## 2021-03-17 NOTE — PROGRESS NOTE-PRE OPERATIVE
Pre-Operative Progress Note


H&P Reviewed


The H&P was reviewed, patient examined and no changes noted.


Time Seen by Provider:  08:46


Date H&P Reviewed:  Mar 17, 2021


Time H&P Reviewed:  08:47


Pre-Operative Diagnosis:  cholelithiasis/cholecystitis











MILTON NDIAYE DO               Mar 17, 2021 08:50

## 2021-03-17 NOTE — DISCHARGE INST-SURGICAL
Discharge Inst-Surgical


Depart Medication/Instructions


New, Converted or Re-Newed RX:  RX Given to Pt/Family


Patient Instructions


Follow up Appt:


Make appointment for 1 week. [phone]





Instructions:


No lifting greater than 20 pounds.


No strenuous activity. 


May shower in 24 hours, no tub bath or soaking.


Use incentive spirometer at home as directed.


No Smoking





Skin/Wound Care:


May remove bandages in am.  You need to leave the Dermabond on incision it will 

fall off on it's own. 





Symptoms to Report:


Appetite Changes, Extremity Discoloration, Numbness/Tingling, Swelling 

Increased, Bleeding Excessive, Eyesight Changes, Pain Increased, Urine Color 

Change, Constipation(Persistent), Fever over 101 degree F, Pain/Pressure in 

chest, Urinating Difficulty, Cough Up/Vomit Blood, Heart Beat Irreg/Pounding, 

Pain/Pressure in jaw, Cramps in feet or legs, Lightheadedness, Pain/Pressure in 

shoulder, Diarrhea(Persistent), Memory Changes Suddenly, Questions/Concerns, 

Weight gain consecutive days, Dizziness/Fainting, Nausea/Vomiting, Shortness of 

Breath, Weight gain over 2 pounds








If questions or concerns contact your physician 


Or seek help at emergency department.





Activity


Activity as Tolerated:  Yes


Activity Instructions:  Avoid Stress to Incision


Driving Instructions:  No Driving/Refer to 





Diet


Discharge Diet:  Avoid Fatty Foods, Low Fat/Low Cholesterol


If Any Problems/Questions/Issu:  Contact Your Physician, Go to Emergency Room





Skin/Wound Care


Infection Signs and Symptoms:  Increased Redness, Foul Odor of Wound, Increased 

Drainage, Skin Itchy or Has a Rash, Increased Swelling, Temperature Above 101  F


Wound Care Comment:  


heating pad to shoulder or neck for pain tonight


Bathing Instructions:  Shower


Stitches/Staples/Dermabond Dis:  [place]ikhail[place]











MILTON NDIAYE DO               Mar 17, 2021 09:56

## 2021-03-17 NOTE — ANESTHESIA-GENERAL POST-OP
General


Patient Condition


Mental Status/LOC:  Same as Preop


Cardiovascular:  Satisfactory


Nausea/Vomiting:  Absent


Respiratory:  Satisfactory


Pain:  Controlled


Complications:  Absent





Post Op Complications


Complications


None





Follow Up Care/Instructions


Patient Instructions


None needed.





Anesthesia/Patient Condition


Patient Condition


Patient is doing well, resting comfortably but has complaints of pain with 

movement but nausea is improved, stable vital signs, no apparent adverse 

anesthesia problems.











RACHAEL AGUILA DO         Mar 17, 2021 12:44

## 2021-03-17 NOTE — PROGRESS NOTE-POST OPERATIVE
Post-Operative Progess Note


Surgeon (s)/Assistant (s)


Surgeon


MILTON NDIAYE DO


Assistant:  Teofilo





Pre-Operative Diagnosis


cholelithiasis/cholecystitis





Post-Operative Diagnosis





same





Procedure & Operative Findings


Date of Procedure


3/17/21


Procedure Performed/Findings


  


PROCEDURE:


Laparoscopic cholecystectomy with intraoperative


cholangiogram. 


 


COMPLICATIONS:


None. 


 


PROCEDURE:


The patient was taken to the operating suite and was prepped and


draped in sterile fashion. A surgical pause was performed. Just


superior to the umbilicus, a 12 mm incision was made. Dissection


was taken down to the fascia, which was then scored and grasped


with a Kocher and the abdomen was then entered.  A 0 Vicryl


suture was placed in a figure-of-eight fashion and a Spears


trocar was placed and secured. Pneumoperitoneum was achieved.


A 5mm trochar place in the subxyphoid and 2 in the right upper quadrant.


The gallbladder was then grasped and elevated. The gallbladder was 


very large and there were adhesions to it; usually indicative of 


previous gallbladder attacks. The cystic duct, and cystic artery 


were then dissected out. Clip was placed on the distal


portion of the cystic duct which was then partially transected.


An arrow catheter was inserted into the duct. 


The cholangiogram was then performed. No filing defects and contrast


made its way into the duodenum.  Catheter removed. Clips were placed


on proximal portion of the cystic duct and then the duct was then


transected. Clips were placed along the proximal and distal


portion of the cystic artery which was then transected. Hook


cautery was used to dissect the gallbladder from the gallbladder


fossa achieving hemostasis. The gallbladder was placed in an


Endobag and removed through the 12 mm trocar site. The abdomen


was then reinspected.  Copious amounts of irrigation were used to


irrigate the abdomen and there were no signs of active bleeding.


Hemostasis had been achieved. The 12 mm fascial defect was then


closed with 0 Vicryl suture that had been placed in a


figure-of-eight fashion. The abdomen was then desufflated, the


trocars were removed. The abdomen was then washed and dried. The


skin was then closed using 4-0 Monocryl in a subcuticular fashion.


The abdomen was washed and dried and Skin Affix was place over incisions.


Patient tolerated the procedure well without any complications 


and was taken to the recovery room in stable condition.








Dr. Red assisted on this case helping to make incision, close incisions


identify anatomy and hold anatomy out of the way.


Anesthesia Type


GET





Estimated Blood Loss


Estimated blood loss (mL):  scant





Specimens/Packing


Specimens Removed


GB and contents











MILTON NDIAYE DO               Mar 17, 2021 09:52

## 2021-10-07 ENCOUNTER — HOSPITAL ENCOUNTER (OUTPATIENT)
Dept: HOSPITAL 75 - PREOP | Age: 31
Discharge: HOME | End: 2021-10-07
Attending: SURGERY
Payer: COMMERCIAL

## 2021-10-07 VITALS — HEIGHT: 65.98 IN | WEIGHT: 240.08 LBS | BODY MASS INDEX: 38.58 KG/M2

## 2021-10-07 DIAGNOSIS — Z01.818: Primary | ICD-10-CM

## 2021-10-07 PROCEDURE — 87635 SARS-COV-2 COVID-19 AMP PRB: CPT

## 2021-10-11 ENCOUNTER — HOSPITAL ENCOUNTER (OUTPATIENT)
Dept: HOSPITAL 75 - ENDO | Age: 31
Discharge: HOME | End: 2021-10-11
Attending: SURGERY
Payer: COMMERCIAL

## 2021-10-11 VITALS — DIASTOLIC BLOOD PRESSURE: 63 MMHG | SYSTOLIC BLOOD PRESSURE: 132 MMHG

## 2021-10-11 VITALS — DIASTOLIC BLOOD PRESSURE: 60 MMHG | SYSTOLIC BLOOD PRESSURE: 126 MMHG

## 2021-10-11 VITALS — SYSTOLIC BLOOD PRESSURE: 132 MMHG | DIASTOLIC BLOOD PRESSURE: 63 MMHG

## 2021-10-11 VITALS — WEIGHT: 240.08 LBS | HEIGHT: 66.93 IN | BODY MASS INDEX: 37.68 KG/M2

## 2021-10-11 VITALS — DIASTOLIC BLOOD PRESSURE: 69 MMHG | SYSTOLIC BLOOD PRESSURE: 122 MMHG

## 2021-10-11 VITALS — SYSTOLIC BLOOD PRESSURE: 126 MMHG | DIASTOLIC BLOOD PRESSURE: 60 MMHG

## 2021-10-11 VITALS — DIASTOLIC BLOOD PRESSURE: 90 MMHG | SYSTOLIC BLOOD PRESSURE: 144 MMHG

## 2021-10-11 DIAGNOSIS — K31.4: ICD-10-CM

## 2021-10-11 DIAGNOSIS — Z83.3: ICD-10-CM

## 2021-10-11 DIAGNOSIS — Z90.49: ICD-10-CM

## 2021-10-11 DIAGNOSIS — Z82.49: ICD-10-CM

## 2021-10-11 DIAGNOSIS — K21.00: ICD-10-CM

## 2021-10-11 DIAGNOSIS — E66.9: ICD-10-CM

## 2021-10-11 DIAGNOSIS — K22.89: ICD-10-CM

## 2021-10-11 DIAGNOSIS — K29.50: Primary | ICD-10-CM

## 2021-10-11 PROCEDURE — 84703 CHORIONIC GONADOTROPIN ASSAY: CPT

## 2021-10-11 NOTE — PROGRESS NOTE-POST OPERATIVE
Post-Operative Progess Note


Surgeon (s)/Assistant (s)


Surgeon


MILTON NDIAYE DO


Assistant:  none





Pre-Operative Diagnosis


chronic esophagitis





Post-Operative Diagnosis





Gastric Diverticula


mild esophagitis





Procedure & Operative Findings


Date of Procedure


10/11/21


Procedure Performed/Findings


EGD with bx





PROCEDURE NOTE:


After informed consent was obtained, the patient was brought to the endoscopy


suite, placed in bed in left lateral decubitus position.  She was administered


IV sedation by the CRNA who then monitored  vitals the entire time, heart


rate, blood pressure and pulse ox and the scope was inserted down the mouth


through the esophagus into the stomach.  On the way down, noted some mild


esophagitis, took a picture and pushed into the stomach.  Noted a gastric 


diverticula and took a picture of it; then pushed past the antrum into the 

duodenum.  


Duodenum looked good.  Pulled back and did a biopsy of antrum, then retroflexed 

the 


scope, did not see a hiatal hernia.  Finally pulled the scope into the GE 

junction


and then did a biopsy of the GE junction.  Pushed the scope back


into the stomach, suctioned all the air out of the stomach. At this point pulled

the 


scope up the esophagus and out the mouth. 





The patient tolerated the procedure, and she recovered in endoscopy suite.


Anesthesia Type


IV sedation by CRNA





Estimated Blood Loss


Estimated blood loss (mL):  scant





Specimens/Packing


Specimens Removed


antral bx


GE jxn bx











MILTON NDIAYE DO               Oct 11, 2021 11:24

## 2021-10-11 NOTE — ENDOSCOPY DISCHARGE INSTRUCT
Endo Procedure/Findings


Findings


1.:  Gastritis


2.:  Other Findings (Gastric diverticula)


3.:  Other Findings (mild esophagitis)





Discharge Instructions


-


Activity: You might feel a little sleepy until tomorrow.  This is due to the 

medicine you received to relax you.





Until tomorrow, you should:  


   NOT drive a car, operate machinery or power tools.


   NOT drink any alcoholic beverages.


   NOT make any important decisions or sign importortant papers.





Do not return to work until tomorrow, unless otherwise instructed. Resume pre

vious activities tomorrow.





Diet: Start by taking liquids.  If you tolerate liquids, advance to solid food.


1.:  EGD in 3 years





Notify Physician


-


If you experience excessive bleeding, unusual abdominal pain, fever, or chest 

pain, contact your doctor immediately.











MILTON NDIAYE DO               Oct 11, 2021 11:25

## 2021-10-11 NOTE — PROGRESS NOTE-PRE OPERATIVE
Pre-Operative Progress Note


H&P Reviewed


The H&P was reviewed, patient examined and no changes noted.


Time Seen by Provider:  10:45


Date H&P Reviewed:  Oct 11, 2021


Time H&P Reviewed:  10:45


Pre-Operative Diagnosis:  chronic esophagitis











MILTON NDIAYE DO               Oct 11, 2021 10:47

## 2021-10-11 NOTE — ANESTHESIA-GENERAL POST-OP
MAC


Patient Condition


Mental Status/LOC:  Same as Preop


Cardiovascular:  Satisfactory


Nausea/Vomiting:  Absent


Respiratory:  Satisfactory


Pain:  Controlled


Complications:  Absent





Post Op Complications


Complications


None





Follow Up Care/Instructions


Patient Instructions


None needed.





Anesthesiology Discharge Order


Discharge Order


Patient is doing well, no complaints, stable vital signs, no apparent adverse 

anesthesia problems.   


No complications reported per nursing.











MIMI GRIMES CRNA          Oct 11, 2021 12:53

## 2022-02-24 ENCOUNTER — HOSPITAL ENCOUNTER (EMERGENCY)
Dept: HOSPITAL 75 - ER | Age: 32
Discharge: HOME | End: 2022-02-24
Payer: COMMERCIAL

## 2022-02-24 VITALS — HEIGHT: 65.75 IN | WEIGHT: 239.86 LBS | BODY MASS INDEX: 39.01 KG/M2

## 2022-02-24 VITALS — SYSTOLIC BLOOD PRESSURE: 142 MMHG | DIASTOLIC BLOOD PRESSURE: 100 MMHG

## 2022-02-24 DIAGNOSIS — Z20.822: ICD-10-CM

## 2022-02-24 DIAGNOSIS — K20.90: ICD-10-CM

## 2022-02-24 DIAGNOSIS — K29.70: Primary | ICD-10-CM

## 2022-02-24 LAB
ALBUMIN SERPL-MCNC: 4.7 GM/DL (ref 3.2–4.5)
ALP SERPL-CCNC: 92 U/L (ref 40–136)
ALT SERPL-CCNC: 24 U/L (ref 0–55)
APTT PPP: YELLOW S
BACTERIA #/AREA URNS HPF: (no result) /HPF
BASOPHILS # BLD AUTO: 0.1 10^3/UL (ref 0–0.1)
BASOPHILS NFR BLD AUTO: 1 % (ref 0–10)
BILIRUB SERPL-MCNC: 0.3 MG/DL (ref 0.1–1)
BILIRUB UR QL STRIP: NEGATIVE
BUN/CREAT SERPL: 11
CALCIUM SERPL-MCNC: 9.2 MG/DL (ref 8.5–10.1)
CHLORIDE SERPL-SCNC: 106 MMOL/L (ref 98–107)
CO2 SERPL-SCNC: 19 MMOL/L (ref 21–32)
CREAT SERPL-MCNC: 0.71 MG/DL (ref 0.6–1.3)
EOSINOPHIL # BLD AUTO: 0 10^3/UL (ref 0–0.3)
EOSINOPHIL NFR BLD AUTO: 0 % (ref 0–10)
FIBRINOGEN PPP-MCNC: CLEAR MG/DL
GFR SERPLBLD BASED ON 1.73 SQ M-ARVRAT: 115 ML/MIN
GLUCOSE SERPL-MCNC: 83 MG/DL (ref 70–105)
GLUCOSE UR STRIP-MCNC: NEGATIVE MG/DL
HCT VFR BLD CALC: 45 % (ref 35–52)
HGB BLD-MCNC: 15.2 G/DL (ref 11.5–16)
KETONES UR QL STRIP: NEGATIVE
LEUKOCYTE ESTERASE UR QL STRIP: NEGATIVE
LIPASE SERPL-CCNC: 30 U/L (ref 8–78)
LYMPHOCYTES # BLD AUTO: 2 10^3/UL (ref 1–4)
LYMPHOCYTES NFR BLD AUTO: 22 % (ref 12–44)
MAGNESIUM SERPL-MCNC: 2.1 MG/DL (ref 1.6–2.4)
MANUAL DIFFERENTIAL PERFORMED BLD QL: NO
MCH RBC QN AUTO: 30 PG (ref 25–34)
MCHC RBC AUTO-ENTMCNC: 34 G/DL (ref 32–36)
MCV RBC AUTO: 90 FL (ref 80–99)
MONOCYTES # BLD AUTO: 0.8 10^3/UL (ref 0–1)
MONOCYTES NFR BLD AUTO: 9 % (ref 0–12)
NEUTROPHILS # BLD AUTO: 6.1 10^3/UL (ref 1.8–7.8)
NEUTROPHILS NFR BLD AUTO: 67 % (ref 42–75)
NITRITE UR QL STRIP: NEGATIVE
PH UR STRIP: 7 [PH] (ref 5–9)
PLATELET # BLD: 241 10^3/UL (ref 130–400)
PMV BLD AUTO: 11.9 FL (ref 9–12.2)
POTASSIUM SERPL-SCNC: 3.6 MMOL/L (ref 3.6–5)
PROT SERPL-MCNC: 8.2 GM/DL (ref 6.4–8.2)
PROT UR QL STRIP: NEGATIVE
RBC #/AREA URNS HPF: (no result) /HPF
SODIUM SERPL-SCNC: 138 MMOL/L (ref 135–145)
SP GR UR STRIP: 1.01 (ref 1.02–1.02)
SQUAMOUS #/AREA URNS HPF: (no result) /HPF
WBC # BLD AUTO: 9 10^3/UL (ref 4.3–11)
WBC #/AREA URNS HPF: (no result) /HPF

## 2022-02-24 PROCEDURE — 87636 SARSCOV2 & INF A&B AMP PRB: CPT

## 2022-02-24 PROCEDURE — 85025 COMPLETE CBC W/AUTO DIFF WBC: CPT

## 2022-02-24 PROCEDURE — 86141 C-REACTIVE PROTEIN HS: CPT

## 2022-02-24 PROCEDURE — 84484 ASSAY OF TROPONIN QUANT: CPT

## 2022-02-24 PROCEDURE — 83690 ASSAY OF LIPASE: CPT

## 2022-02-24 PROCEDURE — 36415 COLL VENOUS BLD VENIPUNCTURE: CPT

## 2022-02-24 PROCEDURE — 83735 ASSAY OF MAGNESIUM: CPT

## 2022-02-24 PROCEDURE — 71045 X-RAY EXAM CHEST 1 VIEW: CPT

## 2022-02-24 PROCEDURE — 80053 COMPREHEN METABOLIC PANEL: CPT

## 2022-02-24 PROCEDURE — 81000 URINALYSIS NONAUTO W/SCOPE: CPT

## 2022-02-24 PROCEDURE — 84703 CHORIONIC GONADOTROPIN ASSAY: CPT

## 2022-02-24 PROCEDURE — 93005 ELECTROCARDIOGRAM TRACING: CPT

## 2022-02-24 NOTE — ED CHEST PAIN
General


Chief Complaint:  Chest Pain


Stated Complaint:  CHEST PAIN


Source:  patient


Exam Limitations:  no limitations





History of Present Illness


Date Seen by Provider:  2022


Time Seen by Provider:  17:55


Initial Comments


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 

she started experiencing chest pain 1 hour ago that was intermittent lasting 

about 1 to 2 minutes at a time, sharp, pinching, epigastric and lower 

substernal.  She had 1 episode of emesis.  No fever chills diarrhea cough or 

shortness of air.  No history of heart disease.  She does have some reflux today

and yesterday.


Multiple episodes in the past of ER visits for gastritis and gallstones.


Last year she has had 2 scopes by Dr. Ndiaye EGD.  Found a polyp which was 

biopsied and he has her set up for another repeat scope in 6 months.  She took 

an aspirin 325 mg prior to coming out.  No antiacids.  She is on Protonix and 

atorvastatin.  No history of hypertension smoking diabetes or previous coronary 

disease.  No personal or family history of coronary disease or blood clots or 

PE.  No cough shortness of air hemoptysis.  She does have some nasal congestion 

for the past week or so she has chalked up to the weather.  No new diarrhea.  

She does occasionally have loose stools with me on what she eats after having 

her gallbladder out.





Allergies and Home Medications


Allergies


Coded Allergies:  


     No Known Drug Allergies (Unverified , 3/17/21)





Patient Home Medication List


Home Medication List Reviewed:  Yes


Cetirizine HCl (Zyrtec) 10 Mg Capsule, 10 MG PO DAILY, (Reported)


   Entered as Reported by: ETTA NEVAREZ on 10/4/21 1438


Fluticasone Propionate (Flonase Allergy Relief) 9.9 Ml [place].susp, 1 SPRAY NS 

DAILY, (Reported)


   Entered as Reported by: ROSALNIA WALDEN on 21


Ondansetron (Ondansetron Odt) 4 Mg Tab.rapdis, 4 MG PO Q6H PRN for 

NAUSEA/VOMITING


   Prescribed by: LENO GATES on 22


Pantoprazole Sodium (Pantoprazole Sodium) 40 Mg Tablet.dr, 40 MG PO DAILY, 

(Reported)


   Entered as Reported by: ETTA NEVAREZ on 10/4/21 1438


Pantoprazole Sodium (Pantoprazole Sodium) 20 Mg Tablet.dr, 20 MG PO BID


   Prescribed by: LENO GATES on 22


Sucralfate (Carafate) 1 Gm Tablet, 1 GM PO QIDACHS


   Prescribed by: LENO GATES on 22





Review of Systems


Review of Systems


Constitutional:  No chills, No fever


EENTM:  No Blurred Vision, No Double Vision


Respiratory:  Denies Cough, Denies Shortness of Air


Cardiovascular:  See HPI, Chest Pain; Denies Lightheadedness


Gastrointestinal:  See HPI, Abdominal Pain; Denies Constipated, Denies Diarrhea;

Nausea; Denies Poor Fluid Intake; Vomiting


Genitourinary:  Denies Burning, Denies Discharge


Musculoskeletal:  No back pain, No joint pain





All Other Systems Reviewed


Negative Unless Noted:  Yes





Past Medical-Social-Family Hx


Patient Social History


Tobacco Use?:  No


Use of E-Cig and/or Vaping dev:  No


Substance use?:  No


Alcohol Use?:  Yes


Pt feels they are or have been:  No





Immunizations Up To Date


First/Initial COVID19 Vaccinat:  UNK


Second COVID19 Vaccination [place]:  UNK


COVID19 Vaccine :  MODERNA





Seasonal Allergies


Seasonal Allergies:  Yes





Past Medical History


Surgeries:  Yes (TAILBONE SURGERY A LONG TIME AGO/?PILONIDAL CYST: OVARIAN CYST 

REMOVAL)


Gallbladder


Respiratory:  No


Cardiac:  No


Neurological:  No


Reproductive Disorders:  Yes (HIGH TESTOSTERONE)


Female Reproductive Disorders:  Menstrual Problems, Ovarian Cyst


Genitourinary:  No


Gastrointestinal:  Yes (GALLSTONES NOTED ON CT SCAN 21)


Gall Bladder Disease


Musculoskeletal:  No


Endocrine:  No


HEENT:  No


Cancer:  No


Psychosocial:  No


Integumentary:  No


Blood Disorders:  No





Physical Exam


Vital Signs





Vital Signs - First Documented








 22





 17:54


 


Temp 36.9


 


Pulse 106


 


Resp 18


 


B/P (MAP) 74/


 


Pulse Ox 96


 


O2 Delivery Room Air


 


O2 Flow Rate 147.00





Capillary Refill :


Height, Weight, BMI


Height: 5'6.00"


Weight: 218lbs. oz. 98.840513xy; 37.68 BMI


Method:Stated


General Appearance:  No Apparent Distress, Mild Distress


HEENT:  PERRL/EOMI, Pharynx Normal, Moist Mucous Membranes


Neck:  Full Range of Motion, Normal Inspection


Respiratory:  Lungs Clear, Normal Breath Sounds, No Accessory Muscle Use, No 

Respiratory Distress


Cardiovascular:  Regular Rate, Rhythm, No Edema, Normal Peripheral Pulses


Gastrointestinal:  Normal Bowel Sounds, Non Tender, Soft


Extremity:  Normal Capillary Refill, Normal Inspection, No Pedal Edema


Neurologic/Psychiatric:  Alert, Oriented x3, No Motor/Sensory Deficits


Skin:  Normal Color, Warm/Dry





Progress/Results/Core Measures


Results/Orders


Lab Results





Laboratory Tests








Test


 22


17:58 22


18:00 22


18:38 22


20:25 Range/Units


 


 


White Blood Count


 9.0 


 


 


 


 4.3-11.0


10^3/uL


 


Red Blood Count


 5.05 


 


 


 


 3.80-5.11


10^6/uL


 


Hemoglobin 15.2     11.5-16.0  g/dL


 


Hematocrit 45     35-52  %


 


Mean Corpuscular Volume 90     80-99  fL


 


Mean Corpuscular Hemoglobin 30     25-34  pg


 


Mean Corpuscular Hemoglobin


Concent 34 


 


 


 


 32-36  g/dL





 


Red Cell Distribution Width 12.3     10.0-14.5  %


 


Platelet Count


 241 


 


 


 


 130-400


10^3/uL


 


Mean Platelet Volume 11.9     9.0-12.2  fL


 


Immature Granulocyte % (Auto) 1      %


 


Neutrophils (%) (Auto) 67     42-75  %


 


Lymphocytes (%) (Auto) 22     12-44  %


 


Monocytes (%) (Auto) 9     0-12  %


 


Eosinophils (%) (Auto) 0     0-10  %


 


Basophils (%) (Auto) 1     0-10  %


 


Neutrophils # (Auto)


 6.1 


 


 


 


 1.8-7.8


10^3/uL


 


Lymphocytes # (Auto)


 2.0 


 


 


 


 1.0-4.0


10^3/uL


 


Monocytes # (Auto)


 0.8 


 


 


 


 0.0-1.0


10^3/uL


 


Eosinophils # (Auto)


 0.0 


 


 


 


 0.0-0.3


10^3/uL


 


Basophils # (Auto)


 0.1 


 


 


 


 0.0-0.1


10^3/uL


 


Immature Granulocyte # (Auto)


 0.1 


 


 


 


 0.0-0.1


10^3/uL


 


Sodium Level 138     135-145  MMOL/L


 


Potassium Level 3.6     3.6-5.0  MMOL/L


 


Chloride Level 106       MMOL/L


 


Carbon Dioxide Level 19 L    21-32  MMOL/L


 


Anion Gap 13     5-14  MMOL/L


 


Blood Urea Nitrogen 8     7-18  MG/DL


 


Creatinine


 0.71 


 


 


 


 0.60-1.30


MG/DL


 


Estimat Glomerular Filtration


Rate 115 


 


 


 


  





 


BUN/Creatinine Ratio 11      


 


Glucose Level 83       MG/DL


 


Calcium Level 9.2     8.5-10.1  MG/DL


 


Corrected Calcium      8.5-10.1  MG/DL


 


Magnesium Level 2.1     1.6-2.4  MG/DL


 


Total Bilirubin 0.3     0.1-1.0  MG/DL


 


Aspartate Amino Transf


(AST/SGOT) 21 


 


 


 


 5-34  U/L





 


Alanine Aminotransferase


(ALT/SGPT) 24 


 


 


 


 0-55  U/L





 


Alkaline Phosphatase 92       U/L


 


Troponin I < 0.028    < 0.028  <0.028  NG/ML


 


C-Reactive Protein High


Sensitivity 1.01 H


 


 


 


 0.00-0.50


MG/DL


 


Total Protein 8.2     6.4-8.2  GM/DL


 


Albumin 4.7 H    3.2-4.5  GM/DL


 


Lipase 30     8-78  U/L


 


Urine Color  YELLOW     


 


Urine Clarity  CLEAR     


 


Urine pH  7.0    5-9  


 


Urine Specific Gravity  1.015 L   1.016-1.022  


 


Urine Protein  NEGATIVE    NEGATIVE  


 


Urine Glucose (UA)  NEGATIVE    NEGATIVE  


 


Urine Ketones  NEGATIVE    NEGATIVE  


 


Urine Nitrite  NEGATIVE    NEGATIVE  


 


Urine Bilirubin  NEGATIVE    NEGATIVE  


 


Urine Urobilinogen  0.2    < = 1.0  MG/DL


 


Urine Leukocyte Esterase  NEGATIVE    NEGATIVE  


 


Urine RBC (Auto)  NEGATIVE    NEGATIVE  


 


Urine RBC  NONE     /HPF


 


Urine WBC  NONE     /HPF


 


Urine Squamous Epithelial


Cells 


 0-2 


 


 


  /HPF





 


Urine Crystals  NONE     /LPF


 


Urine Bacteria  TRACE     /HPF


 


Urine Casts  NONE     /LPF


 


Urine Mucus  NEGATIVE     /LPF


 


Urine Culture Indicated  NO     


 


Influenza Type A (RT-PCR)   Not Detected   Not Detecte  


 


Influenza Type B (RT-PCR)   Not Detected   Not Detecte  


 


SARS-CoV-2 RNA (RT-PCR)   Not Detected   Not Detecte  








My Orders





Orders - LENO GATES


Continuous Ekg Monitoring (22 17:53)


Ekg Tracing (22 17:53)


Ua Culture If Indicated (22 17:53)


Urine Pregnancy Bedside (22 17:53)


Lidocaine 2% Viscous 15 Ml (Xylocaine Vi (22 18:30)


Famotidine Tablet (Pepcid Tablet) (22 18:18)


Antacid  Suspension (Mylanta  Suspension (22 18:30)


Ondansetron Injection (Zofran Injectio (22 18:30)


Cbc With Automated Diff (22 18:18)


Comprehensive Metabolic Panel (22 18:18)


Hs C Reactive Protein (22 18:18)


Magnesium (22 18:18)


Troponin I La (22 18:18)


Chest 1 View, Ap/Pa Only (22 18:18)


Lipase (22 18:18)


Covid 19 Inhouse Test (22 18:18)


Influenza A And B By Pcr (22 18:18)


Troponin I La (22 20:00)





Medications Given in ED





Current Medications








 Medications  Dose


 Ordered  Sig/Ambar


 Route  Start Time


 Stop Time Status Last Admin


Dose Admin


 


 Al Hydrox/Mg


 Hydrox/Simethicone  30 ml  ONCE  ONCE


 PO  22 18:30


 22 18:31 DC 22 18:35


30 ML


 


 Lidocaine HCl  15 ml  ONCE  ONCE


 PO  22 18:30


 22 18:31 DC 22 18:35


15 ML


 


 Ondansetron HCl  4 mg  ONCE  ONCE


 IVP  22 18:30


 22 18:31 DC 22 18:34


4 MG








Vital Signs/I&O











 22





 17:54 18:40


 


Temp 36.9 


 


Pulse 106 92


 


Resp 18 18


 


B/P (MAP) 74/ 130/73


 


Pulse Ox 96 99


 


O2 Delivery Room Air Room Air


 


O2 Flow Rate 147.00 











Progress


Progress Note #1:  


   Time:  18:32


Progress Note


Patient's care some tenderness in her epigastric region.  Suspect gastritis or 

esophageal spasms versus PUD.  We will give her a GI cocktail and some Pepcid.  

We will get a chest x-ray, troponin and labs.


Progress Note #2:  


   Time:  19:36


Progress Note


Pain went away after the GI cocktail.  Patient is having low blood gas and 

belching.  Suspect gastritis/esophagitis.  We will give a dose of troponin III 

hours after initiation of pain, a little before .  We will put her on Caraf

ate and Protonix 20 mg twice daily and follow-up with Dr. Ndiaye.


Initial ECG Impression Date:  2022


Initial ECG Impression Time:  17:56


Initial ECG Rate:  99


Initial ECG Rhythm:  Normal Sinus


Initial ECG Intervals:  Normal


Initial ECG Impression:  Normal


Comment


Normal sinus rhythm without clinically relevant ST elevation or depression.





Diagnostic Imaging





   Diagonstic Imaging:  Xray


   Plain Films/CT/US/NM/MRI:  chest


Comments


                 [facility].


                                [place], [place]





NAME:   MARCELO SAVAGE


MED REC#:   [id number]


ACCOUNT#:   [id number]


PT STATUS:   REG ER


:   1990


PHYSICIAN:   LENO GATES MD


ADMIT DATE:   22/ER


                                  ***Signed***


Date of Exam:22





CHEST 1 VIEW, AP/PA ONLY








INDICATION: Midsternal chest pain.





FINDINGS: The lungs are clear. No failure, effusion or


pneumothorax. 





IMPRESSION: No acute appearing abnormality. 





Dictated by: 





  Dictated on workstation # [id number]








Dict:   22 184


Trans:   22


[facility] [id number]





Interpreted by:     LINDY HERRERA


Electronically signed by: LINDY HERRERA 22


   Reviewed:  Reviewed by Me





Departure


Impression





   Primary Impression:  


   Gastritis


   Qualified Codes:  K29.50 - Unspecified chronic gastritis without bleeding


   Additional Impression:  


   Esophagitis


Disposition:  01 HOME, SELF-CARE


Condition:  Stable





Departure-Patient Inst.


Decision time for Depature:  21:15


Referrals:  


[facility] (PCP/Family)


Primary Care Physician








MILTON NDIAYE DO


Patient Instructions:  Gastritis (DC), Ulcer and Gastritis Diet





Add. Discharge Instructions:  


If you have pain when you can use Tylenol 1000 mg every 8 hours.  You can also 

use Tums, Rolaids, Mylanta/Maalox or other antacids.


 the Carafate and start taking it half an hour before meals and at 

bedtime for a total of 4 times a day.  Do this for 2 weeks to help protect the 

lining of your stomach and esophagus and promote healing.


Start taking Protonix 20 mg twice a day.


Ondansetron 1 tablet every 6 hours as necessary for nausea or vomiting


Follow-up with Dr. Ndiaye if symptoms are not improving at the end of 2 weeks.


Return to ER for intractable pain, vomiting etc.


All discharge instructions reviewed with patient and/or family. Voiced 

understanding.


Scripts


Pantoprazole Sodium (Pantoprazole Sodium) 20 Mg Tablet.dr


20 MG PO BID for 30 Days, #60 TAB 0 Refills


   Prov: LENO GATES         22 


Sucralfate (Carafate) 1 Gm Tablet


1 GM PO QIDACHS for 14 Days, #56 TAB 0 Refills


   Prov: LENO GATES         22 


Ondansetron (Ondansetron Odt) 4 Mg Tab.rapdis


4 MG PO Q6H PRN for NAUSEA/VOMITING, #8 TAB 0 Refills


   Prov: LENO GATES         22


Work/School Note:  Work Release Form   Date Seen in the Emergency Department:  

2022


   Return to Work:  2022


   Restrictions:  No Restrictions





Copy


Copies To 1:   MILTON NDIAYE TITUS J                 2022 18:04

## 2022-02-24 NOTE — DIAGNOSTIC IMAGING REPORT
INDICATION: Midsternal chest pain.



FINDINGS: The lungs are clear. No failure, effusion or

pneumothorax. 



IMPRESSION: No acute appearing abnormality. 



Dictated by: 



  Dictated on workstation # [id number]

## 2022-03-10 ENCOUNTER — HOSPITAL ENCOUNTER (OUTPATIENT)
Dept: HOSPITAL 75 - PREOP | Age: 32
Discharge: HOME | End: 2022-03-10
Attending: SURGERY
Payer: COMMERCIAL

## 2022-03-10 VITALS — HEIGHT: 66.02 IN | BODY MASS INDEX: 38.66 KG/M2 | WEIGHT: 240.52 LBS

## 2022-03-10 DIAGNOSIS — Z20.822: ICD-10-CM

## 2022-03-10 DIAGNOSIS — Z01.812: Primary | ICD-10-CM

## 2022-03-10 PROCEDURE — 87635 SARS-COV-2 COVID-19 AMP PRB: CPT

## 2022-03-14 ENCOUNTER — HOSPITAL ENCOUNTER (OUTPATIENT)
Dept: HOSPITAL 75 - ENDO | Age: 32
Discharge: HOME | End: 2022-03-14
Attending: SURGERY
Payer: COMMERCIAL

## 2022-03-14 VITALS — DIASTOLIC BLOOD PRESSURE: 96 MMHG | SYSTOLIC BLOOD PRESSURE: 127 MMHG

## 2022-03-14 VITALS — SYSTOLIC BLOOD PRESSURE: 132 MMHG | DIASTOLIC BLOOD PRESSURE: 80 MMHG

## 2022-03-14 VITALS — HEIGHT: 66.14 IN | BODY MASS INDEX: 38.66 KG/M2 | WEIGHT: 240.52 LBS

## 2022-03-14 VITALS — DIASTOLIC BLOOD PRESSURE: 88 MMHG | SYSTOLIC BLOOD PRESSURE: 122 MMHG

## 2022-03-14 VITALS — SYSTOLIC BLOOD PRESSURE: 130 MMHG | DIASTOLIC BLOOD PRESSURE: 83 MMHG

## 2022-03-14 VITALS — DIASTOLIC BLOOD PRESSURE: 80 MMHG | SYSTOLIC BLOOD PRESSURE: 132 MMHG

## 2022-03-14 VITALS — SYSTOLIC BLOOD PRESSURE: 135 MMHG | DIASTOLIC BLOOD PRESSURE: 88 MMHG

## 2022-03-14 DIAGNOSIS — K44.9: ICD-10-CM

## 2022-03-14 DIAGNOSIS — E66.9: ICD-10-CM

## 2022-03-14 DIAGNOSIS — K21.00: ICD-10-CM

## 2022-03-14 DIAGNOSIS — K31.4: ICD-10-CM

## 2022-03-14 DIAGNOSIS — K29.50: Primary | ICD-10-CM

## 2022-03-14 PROCEDURE — 84703 CHORIONIC GONADOTROPIN ASSAY: CPT

## 2022-03-14 NOTE — ANESTHESIA-GENERAL POST-OP
MAC


Patient Condition


Mental Status/LOC:  Same as Preop


Cardiovascular:  Satisfactory


Nausea/Vomiting:  Absent


Respiratory:  Satisfactory


Pain:  Controlled


Complications:  Absent





Post Op Complications


Complications


None





Follow Up Care/Instructions


Patient Instructions


None needed.





Anesthesiology Discharge Order


Discharge Order


Patient is doing well, no complaints, stable vital signs, no apparent adverse 

anesthesia problems.   


No complications reported per nursing.











TATIANNA REED CRNA         Mar 14, 2022 14:04

## 2022-03-14 NOTE — PROGRESS NOTE-POST OPERATIVE
Post-Operative Progess Note


Surgeon (s)/Assistant (s)


Surgeon


MITLON NDIAYE DO


Assistant:  none





Pre-Operative Diagnosis


Chronic Gastritis





Post-Operative Diagnosis





Gastric Diverticulum


Gastritis


Hiatal hernia





Procedure & Operative Findings


Date of Procedure


3/14/22


Procedure Performed/Findings


EGD with bx





PROCEDURE NOTE:


After informed consent was obtained, the patient was brought to the endoscopy


suite, placed in bed in left lateral decubitus position.  She was administered


IV sedation by the CRNA who then monitored  vitals the entire time, heart


rate, blood pressure and pulse ox and the scope was inserted down the mouth


through the esophagus into the stomach.  On the way down, noted some mild


esophagitis, took a picture, pushed into the stomach, pushed past the antrum


into the duodenum.  Duodenum looked good.  Pulled back and noted what looked


like a gastric diverticula; took a picture and then did a biopsy of the antrum. 


Retroflexed the scope and saw  a 1-2cm hiatal hernia, took a picture of this


and then pulled the scope into the GE junction, took another picture of the


hiatal hernia and then did a biopsy of the GE junction.  Pushed the scope back


into the stomach, suctioned all the air out of the stomach. At this point pulled

the 


scope up the esophagus and out the mouth. 





The patient tolerated the procedure, and she recovered in endoscopy suite.


Anesthesia Type


IV sedation by anesthesia





Estimated Blood Loss


Estimated blood loss (mL):  scant





Specimens/Packing


Specimens Removed


antral  bx


GE jxn bx











MILTON NDIAYE DO               Mar 14, 2022 13:54

## 2022-03-14 NOTE — ENDOSCOPY DISCHARGE INSTRUCT
Endo Procedure/Findings


Findings


1.:  Gastritis


2.:  Other Findings (Gastric Diverticula)


3.:  Hiatal Hernia





Discharge Instructions


-


Activity: You might feel a little sleepy until tomorrow.  This is due to the 

medicine you received to relax you.





Until tomorrow, you should:  


   NOT drive a car, operate machinery or power tools.


   NOT drink any alcoholic beverages.


   NOT make any important decisions or sign importortant papers.





Do not return to work until tomorrow, unless otherwise instructed. Resume 

previous activities tomorrow.





Diet: Start by taking liquids.  If you tolerate liquids, advance to solid food.


1.:  EGD in 1 year





Notify Physician


-


If you experience excessive bleeding, unusual abdominal pain, fever, or chest 

pain, contact your doctor immediately.











MILTON NDIAYE DO               Mar 14, 2022 13:56

## 2022-03-14 NOTE — PROGRESS NOTE-PRE OPERATIVE
Pre-Operative Progress Note


H&P Reviewed


The H&P was reviewed, patient examined and no changes noted.


Time Seen by Provider:  12:38


Date H&P Reviewed:  Mar 14, 2022


Time H&P Reviewed:  12:38


Pre-Operative Diagnosis:  Chronic Gastritis











MILTON NDIAYE DO               Mar 14, 2022 12:40

## 2023-09-09 ENCOUNTER — HOSPITAL ENCOUNTER (EMERGENCY)
Dept: HOSPITAL 75 - ER | Age: 33
Discharge: HOME | End: 2023-09-09
Payer: SELF-PAY

## 2023-09-09 VITALS — HEIGHT: 66.02 IN | BODY MASS INDEX: 36.85 KG/M2 | WEIGHT: 229.28 LBS

## 2023-09-09 VITALS — DIASTOLIC BLOOD PRESSURE: 103 MMHG | SYSTOLIC BLOOD PRESSURE: 171 MMHG

## 2023-09-09 DIAGNOSIS — L03.113: ICD-10-CM

## 2023-09-09 DIAGNOSIS — R00.0: ICD-10-CM
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
## 2023-09-09 DIAGNOSIS — S40.861A: Primary | ICD-10-CM
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
## 2023-09-09 DIAGNOSIS — R11.0: ICD-10-CM

## 2023-09-09 DIAGNOSIS — Z79.899: ICD-10-CM

## 2023-09-09 NOTE — ED INTEGUMENTARY GENERAL
General


Stated Complaint:  HOT BITE UNDER RT ARM,PT FEELS HOT & COLD





History of Present Illness


Date Seen by Provider:  Sep 9, 2023


Time Seen by Provider:  00:40


Initial Comments


Patient is a 32-year-old female who presents to the emergency room from work 

this evening with a chief complaint of itchy sore red area to her upper inner 

right arm.  She noticed this at work this evening and then started feeling hot 

and flushed.  Onset of symptoms about 5 or 6 hours ago.  Patient has a little 

bit of nausea.  Denies any URI symptoms.  No chest pain or shortness of breath. 

No abdominal pain.  No diarrhea, no dysuria.  No concerns for COVID.  She is 

uncertain what bit her in her upper arm but it is now tender and red.  No active

drainage.  She has not put anything over the area.





She is not a diabetic, she does take cholesterol medication.


Timing/Duration:  this evening, getting worse, other (6 hours)


Severity:  moderate


Location:  extremities (RUE)


Possible Cause:  insect bite (possible)


Associated Symptoms:  fever, flushing, other (nausea)





Allergies and Home Medications


Allergies


Coded Allergies:  


     No Known Drug Allergies (Unverified , 3/17/21)





Patient Home Medication List


Home Medication List Reviewed:  Yes


Cetirizine HCl (Zyrtec) 10 Mg Capsule, 10 MG PO DAILY, (Reported)


   Entered as Reported by: ETTA NEVAREZ on 10/4/21 1438


Doxycycline Hyclate (Doxycycline Hyclate) 100 Mg Tablet, 100 MG PO BID


   Prescribed by: JAC MARTINEZ on 9/9/23 0144


Fluticasone Propionate (Flonase Allergy Relief) 9.9 Ml Donnelly.susp, 1 SPRAY NS 

DAILY, (Reported)


   Entered as Reported by: ROSALINA WALDEN on 2/18/21 2049


Ondansetron (Ondansetron Odt) 4 Mg Tab.rapdis, 4 MG PO Q6H PRN for 

NAUSEA/VOMITING, (Reported)


   Entered as Reported by: MAGGY CLARK on 3/10/22 1119


Ondansetron (Ondansetron Odt) 4 Mg Tab.rapdis, 4 MG SL Q8H PRN for NAUSEA/

VOMITING


   Prescribed by: JAC MARTINEZ on 9/9/23 0144


Pantoprazole Sodium (Pantoprazole Sodium) 40 Mg Tablet.dr, 40 MG PO DAILY, 

(Reported)


   Entered as Reported by: ETTA NEVAREZ on 10/4/21 1438


Sucralfate (Sucralfate) 1 Gm Tablet, 1 GM PO ACHS, (Reported)


   Entered as Reported by: MAGGY CLARK on 3/10/22 1119





Review of Systems


Review of Systems


Constitutional:  see HPI, chills, fever


EENTM:  no symptoms reported


Respiratory:  no symptoms reported


Cardiovascular:  no symptoms reported


Gastrointestinal:  nausea


Genitourinary:  no symptoms reported


Musculoskeletal:  other (upper arm discomfort)


Skin:  pruritus, rash


Psychiatric/Neurological:  No Symptoms Reported





All Other Systems Reviewed


Negative Unless Noted:  Yes





Past Medical-Social-Family Hx


Immunizations Up To Date


First/Initial COVID19 Vaccinat:  yes


Second COVID19 Vaccination [place]:  yes


Third COVID19 Vaccination Date:  NO





Seasonal Allergies


Seasonal Allergies:  Yes





Past Medical History


Surgeries:  Yes (TAILBONE SURGERY A LONG TIME AGO/?PILONIDAL CYST: OVARIAN CYST 

REMOVAL)


Gallbladder


Respiratory:  No


Cardiac:  No


Neurological:  No


Reproductive Disorders:  Yes (HIGH TESTOSTERONE)


Female Reproductive Disorders:  Menstrual Problems, Ovarian Cyst


Genitourinary:  No


Gastrointestinal:  Yes (chronic gastritis)


Gall Bladder Disease


Musculoskeletal:  No


Endocrine:  No


HEENT:  No


Cancer:  No


Psychosocial:  No


Integumentary:  No


Blood Disorders:  No





Physical Exam


Vital Signs





Vital Signs - First Documented








 9/9/23





 00:28


 


Temp 38.0


 


Pulse 113


 


Resp 20


 


B/P (MAP) 171/103 (125)


 


Pulse Ox 99


 


O2 Delivery Room Air





Capillary Refill :


General Appearance:  WD/WN, no apparent distress, other (appears flushed)


HEENT:  PERRL/EOMI


Neck:  full range of motion, supple


Cardiovascular:  regular rate, rhythm (tachy)


Respiratory:  lungs clear, normal breath sounds, no respiratory distress, no 

accessory muscle use


Extremities:  normal range of motion, other (erythema about 5cm diameter to 

inner upper right arm with central ecchymotic lesion (tiny). no necrosis. mildly

tender to palpation. no lymphangitic streaking noted)


Neurologic/Psychiatric:  alert, normal mood/affect, oriented x 3


Skin:  normal color, warm/dry, other (see above extremity exam)


Skin Problem Location:  other (face is quite flushed)





Progress/Results/Core Measures


Results/Orders


My Orders





Orders - MICHELLE,JAC M MD


Ondansetron  Oral Dissolve Tab (Ondanset (9/9/23 00:51)


Ibuprofen  Tablet (Ibuprofen  Tablet) (9/9/23 01:30)


Doxycycline Hyclate Tablet (Doxycycline (9/9/23 01:20)





Medications Given in ED





Current Medications








 Medications  Dose


 Ordered  Sig/Ambar


 Route  Start Time


 Stop Time Status Last Admin


Dose Admin


 


 Ibuprofen  600 mg  ONCE  ONCE


 PO  9/9/23 01:30


 9/9/23 01:31 DC 9/9/23 01:28


600 MG








Vital Signs/I&O











 9/9/23 9/9/23





 00:28 01:01


 


Temp 38.0 38.0


 


Pulse 113 


 


Resp 20 


 


B/P (MAP) 171/103 (125) 


 


Pulse Ox 99 


 


O2 Delivery Room Air 











Progress


Progress Note :  


   Time:  01:37


Progress Note


Patient seen and evaluated by me. Eval today includes physical exam. Patient is 

febrile and tachy at 113HR.  BP is up as well. HEENT is unremarkable - HR is 

regular. Lugs clear/ RUE with apparent insect "bite" to medial upper arm.  

surrounding erythema. tender to palpation.  distal NVI. no lymphangitic 

streaking noted.  Rest of her exam is unremarkable.





DDx based on H&P - brown recluse bite, insect bite, cellulitis





Based on the location of the bite and the characteristics, I suspect possibly 

brown recluse bite. I will go ahead and cover for simple cellulitis with 

doxycycline (good staph coverage). I have advised her to monitor the area 

closely as it is in the fleshy area of her arm and may necrose - if a wound 

forms, advised her to follow up with a PCP, she may need a wound care clinic.  

Tylenol and Ibuprofen for fever. Doxy for 10 days. Return precautions provided 

in both verbal and written format - all questions are sought and answered.  She 

has had no vomiting, diarrhea - labs considered but H&P do not support the need 

at this time.





Departure


Impression





   Primary Impression:  


   Cellulitis


   Qualified Codes:  L03.113 - Cellulitis of right upper limb


   Additional Impression:  


   Insect bite


   Qualified Codes:  S40.861A - Insect bite (nonvenomous) of right upper arm, 

   initial encounter; W57.XXXA - Bitten or stung by nonvenomous insect and other

   nonvenomous arthropods, initial encounter


Disposition:  01 HOME, SELF-CARE


Condition:  Stable





Departure-Patient Inst.


Decision time for Depature:  01:41


Referrals:  


[facility] (PCP/Family)


Primary Care Physician


Patient Instructions:  Cellulitis (Skin Infection), Adult ED





Add. Discharge Instructions:  


Monitor the area closely - it may "open up" and spread. If this starts to occur,

please follow up with [facility] - they may want to refer you in to a 

wound care clinic for aggressive management.





Keep it clean and dry.





Take the antibiotics as prescribed - finish the entire course.





Zofran 4mg orally disintegrating tablets - every 6-8 hours as needed for nausea.





If you have any new, concerning or emergent complaints, please return to the 

Emergency Department for re-evaluation.


Scripts


Doxycycline Hyclate (Doxycycline Hyclate) 100 Mg Tablet


100 MG PO BID, #19 TAB 0 Refills


   Prov: JAC MARTINEZ MD         9/9/23 


Ondansetron (Ondansetron Odt) 4 Mg Tab.rapdis


4 MG SL Q8H PRN for NAUSEA/VOMITING, #12 TAB


   Prov: JAC MARTINEZ MD         9/9/23


Work/School Note:  Work Release Form   Date Seen in the Emergency Department:  

Sep 9, 2023


   Return to Work:  Sep 10, 2023





Copy


Copies To 1:   FLETCHER MURPHY KATHRYN M MD          Sep 9, 2023 00:50

## 2023-09-11 ENCOUNTER — HOSPITAL ENCOUNTER (EMERGENCY)
Dept: HOSPITAL 75 - ER | Age: 33
Discharge: HOME | End: 2023-09-11
Payer: SELF-PAY

## 2023-09-11 VITALS — HEIGHT: 65.75 IN | WEIGHT: 229.28 LBS | BODY MASS INDEX: 37.29 KG/M2

## 2023-09-11 VITALS — DIASTOLIC BLOOD PRESSURE: 66 MMHG | SYSTOLIC BLOOD PRESSURE: 115 MMHG

## 2023-09-11 DIAGNOSIS — T63.301A: Primary | ICD-10-CM

## 2023-09-11 LAB
ALBUMIN SERPL-MCNC: 3.8 GM/DL (ref 3.2–4.5)
ALP SERPL-CCNC: 104 U/L (ref 40–136)
ALT SERPL-CCNC: 68 U/L (ref 0–55)
APTT BLD: 29 SEC (ref 24–35)
APTT PPP: YELLOW S
BACTERIA #/AREA URNS HPF: (no result) /HPF
BASOPHILS # BLD AUTO: 0 10^3/UL (ref 0–0.1)
BASOPHILS NFR BLD AUTO: 0 % (ref 0–10)
BILIRUB SERPL-MCNC: 0.3 MG/DL (ref 0.1–1)
BILIRUB UR QL STRIP: NEGATIVE
BUN/CREAT SERPL: 9
CALCIUM SERPL-MCNC: 8.9 MG/DL (ref 8.5–10.1)
CHLORIDE SERPL-SCNC: 110 MMOL/L (ref 98–107)
CK SERPL-CCNC: 27 U/L (ref 29–168)
CO2 SERPL-SCNC: 22 MMOL/L (ref 21–32)
CREAT SERPL-MCNC: 0.74 MG/DL (ref 0.6–1.3)
D DIMER PPP FEU-MCNC: 0.99 UG/ML (ref 0–0.49)
EOSINOPHIL # BLD AUTO: 0 10^3/UL (ref 0–0.3)
EOSINOPHIL NFR BLD AUTO: 0 % (ref 0–10)
FIBRINOGEN PPP-MCNC: (no result) MG/DL
FIBRINOGEN PPP-MCNC: 554 MG/DL (ref 221–496)
GFR SERPLBLD BASED ON 1.73 SQ M-ARVRAT: 109 ML/MIN
GLUCOSE SERPL-MCNC: 120 MG/DL (ref 70–105)
GLUCOSE UR STRIP-MCNC: NEGATIVE MG/DL
HCT VFR BLD CALC: 40 % (ref 35–52)
HGB BLD-MCNC: 13.1 G/DL (ref 11.5–16)
INR PPP: 0.9 (ref 0.8–1.4)
KETONES UR QL STRIP: NEGATIVE
LEUKOCYTE ESTERASE UR QL STRIP: NEGATIVE
LYMPHOCYTES # BLD AUTO: 1 10^3/UL (ref 1–4)
LYMPHOCYTES NFR BLD AUTO: 10 % (ref 12–44)
MANUAL DIFFERENTIAL PERFORMED BLD QL: NO
MCH RBC QN AUTO: 30 PG (ref 25–34)
MCHC RBC AUTO-ENTMCNC: 33 G/DL (ref 32–36)
MCV RBC AUTO: 90 FL (ref 80–99)
MONOCYTES # BLD AUTO: 0.6 10^3/UL (ref 0–1)
MONOCYTES NFR BLD AUTO: 6 % (ref 0–12)
NEUTROPHILS # BLD AUTO: 8.5 10^3/UL (ref 1.8–7.8)
NEUTROPHILS NFR BLD AUTO: 83 % (ref 42–75)
NITRITE UR QL STRIP: NEGATIVE
PH UR STRIP: 7 [PH] (ref 5–9)
PLATELET # BLD: 179 10^3/UL (ref 130–400)
PMV BLD AUTO: 11.2 FL (ref 9–12.2)
POTASSIUM SERPL-SCNC: 4.1 MMOL/L (ref 3.6–5)
PROT SERPL-MCNC: 6.5 GM/DL (ref 6.4–8.2)
PROT UR QL STRIP: NEGATIVE
PROTHROMBIN TIME: 12.7 SEC (ref 12.2–14.7)
RBC #/AREA URNS HPF: (no result) /HPF
SODIUM SERPL-SCNC: 140 MMOL/L (ref 135–145)
SP GR UR STRIP: 1.01 (ref 1.02–1.02)
WBC # BLD AUTO: 10.2 10^3/UL (ref 4.3–11)
WBC #/AREA URNS HPF: (no result) /HPF

## 2023-09-11 PROCEDURE — 85025 COMPLETE CBC W/AUTO DIFF WBC: CPT

## 2023-09-11 PROCEDURE — 85384 FIBRINOGEN ACTIVITY: CPT

## 2023-09-11 PROCEDURE — 83605 ASSAY OF LACTIC ACID: CPT

## 2023-09-11 PROCEDURE — 36415 COLL VENOUS BLD VENIPUNCTURE: CPT

## 2023-09-11 PROCEDURE — 81000 URINALYSIS NONAUTO W/SCOPE: CPT

## 2023-09-11 PROCEDURE — 87088 URINE BACTERIA CULTURE: CPT

## 2023-09-11 PROCEDURE — 82550 ASSAY OF CK (CPK): CPT

## 2023-09-11 PROCEDURE — 80053 COMPREHEN METABOLIC PANEL: CPT

## 2023-09-11 PROCEDURE — 85379 FIBRIN DEGRADATION QUANT: CPT

## 2023-09-11 PROCEDURE — 84703 CHORIONIC GONADOTROPIN ASSAY: CPT

## 2023-09-11 PROCEDURE — 85610 PROTHROMBIN TIME: CPT

## 2023-09-11 PROCEDURE — 83615 LACTATE (LD) (LDH) ENZYME: CPT

## 2023-09-11 PROCEDURE — 85730 THROMBOPLASTIN TIME PARTIAL: CPT

## 2023-09-11 PROCEDURE — 87040 BLOOD CULTURE FOR BACTERIA: CPT

## 2023-09-11 NOTE — ED INTEGUMENTARY GENERAL
General


Chief Complaint:  Bite-Animal/Human/Insect


Stated Complaint:  SPIDER BITE RIGHT UPPER ARM


Nursing Triage Note:  


WAS SEEN IN THE ER FRIDAY NIGHT FOR QA SPIDER BITE TO THE RIGHT ARM.  PT COMES 


HERE TODAY BECAUSE SHE FEELS IT IS WORSE DESPITE BEING ON DOXY.  PT STATES SHE 


HAS BEEN FEBRILE.


Source:  patient


Exam Limitations:  no limitations





History of Present Illness


Date Seen by Provider:  Sep 11, 2023


Time Seen by Provider:  16:49


Initial Comments


32-year-old female presents to the ER with reports of worsening of her spider 

bite.  She states that she was seen here on Friday, 9/9/2023, and diagnosed with

a brown recluse spider bite.  She was started on doxycycline at that time.  She 

followed up with [facility] the next day and was given a shot of Rocephin.  She returns

today because the area of redness has increased, the center has turned dark 

color, she also has fevers which only occur at night.  States that her fever has

ranged from 100.6-102.4.  Last night her fever was 100.6.  She also reports that

her whole body is red, she complains of itching in her abdomen, bilateral hands,

bilateral feet.  The symptoms started after starting the doxycycline.





Allergies and Home Medications


Allergies


Coded Allergies:  


     doxycycline (Verified  Allergy, Unknown, rash, 9/11/23)





Patient Home Medication List


Home Medication List Reviewed:  Yes


Cephalexin (Cephalexin) 500 Mg Tablet, 500 MG PO QID


   Prescribed by: Almaz Bee on 9/11/23 1910


Cetirizine HCl (Zyrtec) 10 Mg Capsule, 10 MG PO DAILY, (Reported)


   Entered as Reported by: ETTA NEVAREZ on 10/4/21 1438


Doxycycline Hyclate (Doxycycline Hyclate) 100 Mg Tablet, 100 MG PO BID


   Prescribed by: JAC MARTINEZ on 9/9/23 0144


Fluticasone Propionate (Flonase Allergy Relief) 9.9 Ml Nada.susp, 1 SPRAY NS 

DAILY, (Reported)


   Entered as Reported by: ROSALINA WALDEN on 2/18/21 2049


Ondansetron (Ondansetron Odt) 4 Mg Tab.rapdis, 4 MG PO Q6H PRN for 

NAUSEA/VOMITING, (Reported)


   Entered as Reported by: MAGGY CLARK on 3/10/22 1119


Ondansetron (Ondansetron Odt) 4 Mg Tab.rapdis, 4 MG SL Q8H PRN for 

NAUSEA/VOMITING


   Prescribed by: JAC MARTINEZ on 9/9/23 0144


Pantoprazole Sodium (Pantoprazole Sodium) 40 Mg Tablet.dr, 40 MG PO DAILY, 

(Reported)


   Entered as Reported by: ETTA NEVAREZ on 10/4/21 1438


Sucralfate (Sucralfate) 1 Gm Tablet, 1 GM PO ACHS, (Reported)


   Entered as Reported by: MAGGY CLARK on 3/10/22 1119





Review of Systems


Review of Systems


Constitutional:  see HPI





Past Medical-Social-Family Hx


Patient Social History


Tobacco Use?:  No


Substance use?:  No


Alcohol Use?:  Yes


Alcohol Frequency:  Once in a while





Immunizations Up To Date


First/Initial COVID19 Vaccinat:  yes


Second COVID19 Vaccination [place]:  yes


Third COVID19 Vaccination Date:  NO





Seasonal Allergies


Seasonal Allergies:  Yes





Past Medical History


Surgeries:  Yes (TAILBONE SURGERY A LONG TIME AGO/?PILONIDAL CYST: OVARIAN CYST 

REMOVAL)


Gallbladder


Respiratory:  No


Cardiac:  No


Neurological:  No


Last Menstrual Period:  Aug 18, 2023


Reproductive Disorders:  Yes (HIGH TESTOSTERONE)


Female Reproductive Disorders:  Menstrual Problems, Ovarian Cyst


Genitourinary:  No


Gastrointestinal:  Yes (chronic gastritis)


Gall Bladder Disease


Musculoskeletal:  No


Endocrine:  No


HEENT:  No


Cancer:  No


Psychosocial:  No


Integumentary:  No


Blood Disorders:  No





Physical Exam


Vital Signs





Vital Signs - First Documented








 9/11/23





 16:45


 


Temp 36.9


 


Pulse 122


 


Resp 16


 


B/P (MAP) 122/80 (94)


 


Pulse Ox 97


 


O2 Delivery Room Air





Capillary Refill : Less Than 3 Seconds


General Appearance:  WD/WN, no apparent distress


Neck:  supple, normal inspection


Cardiovascular:  tachycardia


Respiratory:  lungs clear, normal breath sounds, no respiratory distress, no 

accessory muscle use


Extremities:  normal range of motion


Neurologic/Psychiatric:  alert, normal mood/affect


Skin:  normal color, warm/dry


Skin Problem Location:  upper extremities (Right upper medial arm)


Skin Problem Character:  erythema (Classic brown recluse spider bite 

presentation, blue center, surrounded by small amount of white, and erythema 

surrounding entire area.)





Progress/Results/Core Measures


Results/Orders


Lab Results





Laboratory Tests








Test


 9/11/23


17:15 9/11/23


18:00 Range/Units


 


 


White Blood Count


 10.2 


 


 4.3-11.0


10^3/uL


 


Red Blood Count


 4.44 


 


 3.80-5.11


10^6/uL


 


Hemoglobin 13.1   11.5-16.0  g/dL


 


Hematocrit 40   35-52  %


 


Mean Corpuscular Volume 90   80-99  fL


 


Mean Corpuscular Hemoglobin 30   25-34  pg


 


Mean Corpuscular Hemoglobin


Concent 33 


 


 32-36  g/dL





 


Red Cell Distribution Width 12.9   10.0-14.5  %


 


Platelet Count


 179 


 


 130-400


10^3/uL


 


Mean Platelet Volume 11.2   9.0-12.2  fL


 


Immature Granulocyte % (Auto) 0    %


 


Neutrophils (%) (Auto) 83 H  42-75  %


 


Lymphocytes (%) (Auto) 10 L  12-44  %


 


Monocytes (%) (Auto) 6   0-12  %


 


Eosinophils (%) (Auto) 0   0-10  %


 


Basophils (%) (Auto) 0   0-10  %


 


Neutrophils # (Auto)


 8.5 H


 


 1.8-7.8


10^3/uL


 


Lymphocytes # (Auto)


 1.0 


 


 1.0-4.0


10^3/uL


 


Monocytes # (Auto)


 0.6 


 


 0.0-1.0


10^3/uL


 


Eosinophils # (Auto)


 0.0 


 


 0.0-0.3


10^3/uL


 


Basophils # (Auto)


 0.0 


 


 0.0-0.1


10^3/uL


 


Immature Granulocyte # (Auto)


 0.0 


 


 0.0-0.1


10^3/uL


 


Prothrombin Time 12.7   12.2-14.7  SEC


 


INR Comment 0.9   0.8-1.4  


 


Activated Partial


Thromboplast Time 29 


 


 24-35  SEC





 


Fibrinogen 554 H  221-496  MG/DL


 


D-Dimer


 0.99 H


 


 0.00-0.49


UG/ML


 


Sodium Level 140   135-145  MMOL/L


 


Potassium Level 4.1   3.6-5.0  MMOL/L


 


Chloride Level 110 H    MMOL/L


 


Carbon Dioxide Level 22   21-32  MMOL/L


 


Anion Gap 8   5-14  MMOL/L


 


Blood Urea Nitrogen 7   7-18  MG/DL


 


Creatinine


 0.74 


 


 0.60-1.30


MG/DL


 


Estimat Glomerular Filtration


Rate 109 


 


  





 


BUN/Creatinine Ratio 9    


 


Glucose Level 120 H    MG/DL


 


Lactic Acid Level


 1.48 


 


 0.50-2.00


MMOL/L


 


Calcium Level 8.9   8.5-10.1  MG/DL


 


Corrected Calcium 9.1   8.5-10.1  MG/DL


 


Total Bilirubin 0.3   0.1-1.0  MG/DL


 


Aspartate Amino Transf


(AST/SGOT) 45 H


 


 5-34  U/L





 


Alanine Aminotransferase


(ALT/SGPT) 68 H


 


 0-55  U/L





 


Alkaline Phosphatase 104     U/L


 


Lactate Dehydrogenase 267 H  125-220  U/L


 


Total Creatine Kinase 27 L    U/L


 


Total Protein 6.5   6.4-8.2  GM/DL


 


Albumin 3.8   3.2-4.5  GM/DL


 


Urine Color  YELLOW   


 


Urine Clarity


 


 SLIGHTLY


CLOUDY  





 


Urine pH  7.0  5-9  


 


Urine Specific Gravity  1.010 L 1.016-1.022  


 


Urine Protein  NEGATIVE  NEGATIVE  


 


Urine Glucose (UA)  NEGATIVE  NEGATIVE  


 


Urine Ketones  NEGATIVE  NEGATIVE  


 


Urine Nitrite  NEGATIVE  NEGATIVE  


 


Urine Bilirubin  NEGATIVE  NEGATIVE  


 


Urine Urobilinogen  1.0  < = 1.0  MG/DL


 


Urine Leukocyte Esterase  NEGATIVE  NEGATIVE  


 


Urine RBC (Auto)  NEGATIVE  NEGATIVE  


 


Urine RBC  NONE   /HPF


 


Urine WBC  2-5   /HPF


 


Urine Squamous Epithelial


Cells 


 10-25 H


  /HPF





 


Urine Crystals  NONE   /LPF


 


Urine Bacteria  LARGE H  /HPF


 


Urine Casts  NONE   /LPF


 


Urine Mucus  SMALL H  /LPF


 


Urine Culture Indicated  YES   








My Orders





Orders - ALMAZ CABRERA


Ua Culture If Indicated (9/11/23 17:03)


Cbc With Automated Diff (9/11/23 17:03)


Comprehensive Metabolic Panel (9/11/23 17:03)


Blood Culture (9/11/23 17:03)


Protime With Inr (9/11/23 17:03)


Partial Thromboplastin Time (9/11/23 17:03)


Ed Iv/Invasive Line Start (9/11/23 17:03)


Lactic Acid Analyzer (9/11/23 17:03)


Creatine Kinase (9/11/23 17:03)


Fibrin Degradation Products (9/11/23 17:03)


Fibrinogen (9/11/23 17:03)


LDH (9/11/23 17:03)


Ns Iv 1000 Ml (Ns Iv 1000 Ml) (9/11/23 17:15)


Urine Culture (9/11/23 18:00)


Fentanyl  Injection (Fentanyl  Injection (9/11/23 18:45)


Cephalexin Capsule (Cephalexin Capsule) (9/11/23 19:15)





Medications Given in ED





Current Medications








 Medications  Dose


 Ordered  Sig/Ambar


 Route  Start Time


 Stop Time Status Last Admin


Dose Admin


 


 Cephalexin HCl  500 mg  ONCE  ONCE


 PO  9/11/23 19:15


 9/11/23 19:16 DC 9/11/23 19:16


500 MG


 


 Fentanyl Citrate  50 mcg  ONCE  ONCE


 IVP  9/11/23 18:45


 9/11/23 18:46 DC 9/11/23 18:51


50 MCG








Vital Signs/I&O











 9/11/23 9/11/23





 16:45 19:15


 


Temp 36.9 36.5


 


Pulse 122 97


 


Resp 16 16


 


B/P (MAP) 122/80 (94) 115/66


 


Pulse Ox 97 97


 


O2 Delivery Room Air Room Air














Blood Pressure Mean:                    94











Progress


Progress Note :  


Progress Note


Patient seen and evaluated, resting comfortably in bed, no acute distress.  Due 

to classic brown recluse spider bite presentation and systemic symptoms of a 

fever, I am concerned for systemic loxoscelism.  Work-up initiated including 

CBC, CMP, coags, lactic acid, blood cultures x2, urinalysis, D-dimer, 

fibrinogen, LDH.  IV fluids ordered.





1906 Labs reviewed. CBC shows normal hemoglobin, slightly elevated neutrophil 

percentage 83.  CMP shows slightly elevated chloride 110.  AST and ALT slightly 

elevated.  Lactate dehydrogenase is slightly elevated to 67.  Total creatinine 

kinase is negative.  Coags normal.  Fibrinogen elevated 554.  D-dimer slightly 

elevated 0.99.  Urinalysis negative for RBCs.  Based on labs and urinalysis, I 

do not think patient has systemic loxoscelism.  Results discussed with patient. 

Patient states pain has improved after fentanyl.  Will switch patient from 

doxycycline to Keflex due to likely allergy to doxycycline.  Will provide 

patient with phone numbers to surgeons for follow-up.  Instructed to return if 

symptoms continue to get worse.  Discharge instructions and return precautions 

provided.





Departure


Impression





   Primary Impression:  


   Brown recluse spider bite


Disposition:  01 HOME, SELF-CARE


Condition:  Stable





Departure-Patient Inst.


Decision time for Depature:  19:08


Referrals:  


MILTON NDIAYE BRITTANY B MD (PCP/Family)


Primary Care Physician


Patient Instructions:  Wound Care (DC)





Add. Discharge Instructions:  


Stop taking the doxycycline.


Start taking Keflex.  Complete full course of Keflex as prescribed.


Follow-up with Dr. Ndiaye, surgery, or evaluation of the wound.


Return if you are getting worse not better, or any other new, concerning, or 

worsening symptoms.





All discharge instructions reviewed with patient and/or family. Voiced 

understanding.


Scripts


Cephalexin (Cephalexin) 500 Mg Tablet


500 MG PO QID for 10 Days, #40 TAB 0 Refills


   Prov: ALMAZ CABRERA         9/11/23











ALMAZ CABRERA          Sep 11, 2023 17:11